# Patient Record
Sex: MALE | Race: WHITE | NOT HISPANIC OR LATINO
[De-identification: names, ages, dates, MRNs, and addresses within clinical notes are randomized per-mention and may not be internally consistent; named-entity substitution may affect disease eponyms.]

---

## 2021-04-12 ENCOUNTER — APPOINTMENT (OUTPATIENT)
Dept: DISASTER EMERGENCY | Facility: OTHER | Age: 76
End: 2021-04-12

## 2021-04-12 PROBLEM — Z00.00 ENCOUNTER FOR PREVENTIVE HEALTH EXAMINATION: Status: ACTIVE | Noted: 2021-04-12

## 2023-01-02 RX ADMIN — Medication 1000 MILLIGRAM(S): at 12:04

## 2023-01-04 ENCOUNTER — APPOINTMENT (OUTPATIENT)
Dept: ORTHOPEDIC SURGERY | Facility: CLINIC | Age: 78
End: 2023-01-04
Payer: MEDICARE

## 2023-01-04 VITALS — HEIGHT: 71 IN | WEIGHT: 207 LBS | BODY MASS INDEX: 28.98 KG/M2

## 2023-01-04 DIAGNOSIS — M75.41 IMPINGEMENT SYNDROME OF RIGHT SHOULDER: ICD-10-CM

## 2023-01-04 DIAGNOSIS — Z86.79 PERSONAL HISTORY OF OTHER DISEASES OF THE CIRCULATORY SYSTEM: ICD-10-CM

## 2023-01-04 DIAGNOSIS — Z86.39 PERSONAL HISTORY OF OTHER ENDOCRINE, NUTRITIONAL AND METABOLIC DISEASE: ICD-10-CM

## 2023-01-04 DIAGNOSIS — M50.90 CERVICAL DISC DISORDER, UNSPECIFIED, UNSPECIFIED CERVICAL REGION: ICD-10-CM

## 2023-01-04 PROCEDURE — 99204 OFFICE O/P NEW MOD 45 MIN: CPT

## 2023-01-04 PROCEDURE — 73030 X-RAY EXAM OF SHOULDER: CPT | Mod: RT

## 2023-01-04 PROCEDURE — 73010 X-RAY EXAM OF SHOULDER BLADE: CPT | Mod: RT

## 2023-01-04 PROCEDURE — 72040 X-RAY EXAM NECK SPINE 2-3 VW: CPT

## 2023-01-04 NOTE — HISTORY OF PRESENT ILLNESS
[de-identified] : 77 year old male  (RHD Britany suarez)  right shoulder and neck since 12/2022 which he thinks was from having a cold.  pain located in the anterior and superior aspect of right shoulder, upper trap and cervical  with associated stiffness and ROM loss at cervical and right shoulder. worse with shoulder movement, sleeping.  has tried Advil.\par \par H/O Diabetes\par

## 2023-01-04 NOTE — IMAGING
[de-identified] : NECK:\par Inspection: no ecchymosis. \par Palpation: trapezial tenderness. \par Range of motion:  Full range of motion with mild stiffness . Pain at extremes of rotation to right. \par Strength Testing: motor exam is non-focal throughout both lower extremities\par Normal Deltoid, Biceps, Triceps, Wrist Flexors, Finger Abductors, Grasp \par Neurological testing: light touch is intact throughout both upper extremities\par Lopez reflex: neg\par Spurling test: neg\par \par \par \par RIGHT SHOULDER\par Inspection: No swelling. \par Palpation: Tenderness is noted at the bicipital groove, anterior and lateral. \par Range of motion: There is pain with range of motion.\par , ER 55, @90ER 90, @90IR 30\par Strength: There is pain and discomfort with strength testing.\par Forward Flexion 4/5. Abduction 4/5.  External Rotation 5-/5 and Internal Rotation 5/5 \par Neurological testings: motor and sensor intact distally.\par Ligament Stability and Special Tests: \par There is positive arc of pain. \par Shoulder apprehension: neg\par Shoulder relocation: neg\par Martinez’s test: pos\par Biceps Active test: neg\par [place] Labral Shear: neg\par Impingement testing: pos\par Wing testing: pos\par Whipple: pos\par Cross Body Adduction: neg\par \par \par

## 2023-01-04 NOTE — ASSESSMENT
[FreeTextEntry1] : Right X-Ray Examination of the SHOULDER (2 views):  no fractures, subluxations or dislocations. \par X-Ray Examination of the SCAPULA 1 or 2 views shows: no significant abnormalities\par X-Ray Examination of the CERVICAL SPINE 3 views (or less) shows: straightening consistent with spasm and disc space narrowing. \par \par - We discussed their diagnosis and treatment options at length including the risks and benefits of both surgical and non-surgical options.\par - We will conservative treatment with a course of PT and anti-inflammatory medication.\par - Rx given for Medrol dose pack\par - Discussed the possible side effects of medication along with the timing and frequency for taking.\par - fu with spine team if not better\par \par \par

## 2023-01-29 ENCOUNTER — INPATIENT (INPATIENT)
Facility: HOSPITAL | Age: 78
LOS: 7 days | Discharge: NURSING, REHAB OR LONG-TERM CARE | DRG: 281 | End: 2023-02-06
Attending: INTERNAL MEDICINE | Admitting: INTERNAL MEDICINE
Payer: MEDICARE

## 2023-01-29 VITALS
RESPIRATION RATE: 18 BRPM | SYSTOLIC BLOOD PRESSURE: 140 MMHG | WEIGHT: 250 LBS | DIASTOLIC BLOOD PRESSURE: 80 MMHG | HEART RATE: 90 BPM | TEMPERATURE: 98 F | OXYGEN SATURATION: 99 % | HEIGHT: 71 IN

## 2023-01-29 DIAGNOSIS — R06.00 DYSPNEA, UNSPECIFIED: ICD-10-CM

## 2023-01-29 DIAGNOSIS — Z98.890 OTHER SPECIFIED POSTPROCEDURAL STATES: Chronic | ICD-10-CM

## 2023-01-29 DIAGNOSIS — N40.0 BENIGN PROSTATIC HYPERPLASIA WITHOUT LOWER URINARY TRACT SYMPTOMS: ICD-10-CM

## 2023-01-29 DIAGNOSIS — I10 ESSENTIAL (PRIMARY) HYPERTENSION: ICD-10-CM

## 2023-01-29 DIAGNOSIS — F90.9 ATTENTION-DEFICIT HYPERACTIVITY DISORDER, UNSPECIFIED TYPE: ICD-10-CM

## 2023-01-29 DIAGNOSIS — R50.9 FEVER, UNSPECIFIED: ICD-10-CM

## 2023-01-29 DIAGNOSIS — R77.8 OTHER SPECIFIED ABNORMALITIES OF PLASMA PROTEINS: ICD-10-CM

## 2023-01-29 DIAGNOSIS — J45.909 UNSPECIFIED ASTHMA, UNCOMPLICATED: ICD-10-CM

## 2023-01-29 DIAGNOSIS — R06.09 OTHER FORMS OF DYSPNEA: ICD-10-CM

## 2023-01-29 DIAGNOSIS — E11.9 TYPE 2 DIABETES MELLITUS WITHOUT COMPLICATIONS: ICD-10-CM

## 2023-01-29 DIAGNOSIS — Z90.49 ACQUIRED ABSENCE OF OTHER SPECIFIED PARTS OF DIGESTIVE TRACT: Chronic | ICD-10-CM

## 2023-01-29 LAB
ALBUMIN SERPL ELPH-MCNC: 3.8 G/DL — SIGNIFICANT CHANGE UP (ref 3.3–5)
ALP SERPL-CCNC: 56 U/L — SIGNIFICANT CHANGE UP (ref 40–120)
ALT FLD-CCNC: 20 U/L — SIGNIFICANT CHANGE UP (ref 10–45)
ANION GAP SERPL CALC-SCNC: 13 MMOL/L — SIGNIFICANT CHANGE UP (ref 5–17)
APPEARANCE UR: CLEAR — SIGNIFICANT CHANGE UP
APTT BLD: 31 SEC — SIGNIFICANT CHANGE UP (ref 27.5–35.5)
AST SERPL-CCNC: 27 U/L — SIGNIFICANT CHANGE UP (ref 10–40)
BACTERIA # UR AUTO: NEGATIVE — SIGNIFICANT CHANGE UP
BASOPHILS # BLD AUTO: 0.04 K/UL — SIGNIFICANT CHANGE UP (ref 0–0.2)
BASOPHILS NFR BLD AUTO: 0.4 % — SIGNIFICANT CHANGE UP (ref 0–2)
BILIRUB SERPL-MCNC: 0.7 MG/DL — SIGNIFICANT CHANGE UP (ref 0.2–1.2)
BILIRUB UR-MCNC: NEGATIVE — SIGNIFICANT CHANGE UP
BUN SERPL-MCNC: 10 MG/DL — SIGNIFICANT CHANGE UP (ref 7–23)
CALCIUM SERPL-MCNC: 9.9 MG/DL — SIGNIFICANT CHANGE UP (ref 8.4–10.5)
CHLORIDE SERPL-SCNC: 98 MMOL/L — SIGNIFICANT CHANGE UP (ref 96–108)
CO2 SERPL-SCNC: 24 MMOL/L — SIGNIFICANT CHANGE UP (ref 22–31)
COLOR SPEC: YELLOW — SIGNIFICANT CHANGE UP
CREAT SERPL-MCNC: 0.62 MG/DL — SIGNIFICANT CHANGE UP (ref 0.5–1.3)
DIFF PNL FLD: ABNORMAL
EGFR: 98 ML/MIN/1.73M2 — SIGNIFICANT CHANGE UP
EOSINOPHIL # BLD AUTO: 0.08 K/UL — SIGNIFICANT CHANGE UP (ref 0–0.5)
EOSINOPHIL NFR BLD AUTO: 0.8 % — SIGNIFICANT CHANGE UP (ref 0–6)
EPI CELLS # UR: 2 /HPF — SIGNIFICANT CHANGE UP
GLUCOSE SERPL-MCNC: 105 MG/DL — HIGH (ref 70–99)
GLUCOSE UR QL: ABNORMAL
HCT VFR BLD CALC: 38.5 % — LOW (ref 39–50)
HGB BLD-MCNC: 12.6 G/DL — LOW (ref 13–17)
HYALINE CASTS # UR AUTO: 3 /LPF — HIGH (ref 0–2)
IMM GRANULOCYTES NFR BLD AUTO: 0.3 % — SIGNIFICANT CHANGE UP (ref 0–0.9)
INR BLD: 1.43 RATIO — HIGH (ref 0.88–1.16)
KETONES UR-MCNC: ABNORMAL
LEUKOCYTE ESTERASE UR-ACNC: NEGATIVE — SIGNIFICANT CHANGE UP
LYMPHOCYTES # BLD AUTO: 1.49 K/UL — SIGNIFICANT CHANGE UP (ref 1–3.3)
LYMPHOCYTES # BLD AUTO: 14.2 % — SIGNIFICANT CHANGE UP (ref 13–44)
MCHC RBC-ENTMCNC: 26.1 PG — LOW (ref 27–34)
MCHC RBC-ENTMCNC: 32.7 GM/DL — SIGNIFICANT CHANGE UP (ref 32–36)
MCV RBC AUTO: 79.7 FL — LOW (ref 80–100)
MONOCYTES # BLD AUTO: 0.73 K/UL — SIGNIFICANT CHANGE UP (ref 0–0.9)
MONOCYTES NFR BLD AUTO: 7 % — SIGNIFICANT CHANGE UP (ref 2–14)
NEUTROPHILS # BLD AUTO: 8.12 K/UL — HIGH (ref 1.8–7.4)
NEUTROPHILS NFR BLD AUTO: 77.3 % — HIGH (ref 43–77)
NITRITE UR-MCNC: NEGATIVE — SIGNIFICANT CHANGE UP
NRBC # BLD: 0 /100 WBCS — SIGNIFICANT CHANGE UP (ref 0–0)
PH UR: 6 — SIGNIFICANT CHANGE UP (ref 5–8)
PLATELET # BLD AUTO: 358 K/UL — SIGNIFICANT CHANGE UP (ref 150–400)
POTASSIUM SERPL-MCNC: 4.4 MMOL/L — SIGNIFICANT CHANGE UP (ref 3.5–5.3)
POTASSIUM SERPL-SCNC: 4.4 MMOL/L — SIGNIFICANT CHANGE UP (ref 3.5–5.3)
PROCALCITONIN SERPL-MCNC: 0.14 NG/ML — HIGH (ref 0.02–0.1)
PROT SERPL-MCNC: 8 G/DL — SIGNIFICANT CHANGE UP (ref 6–8.3)
PROT UR-MCNC: ABNORMAL
PROTHROM AB SERPL-ACNC: 16.6 SEC — HIGH (ref 10.5–13.4)
RAPID RVP RESULT: SIGNIFICANT CHANGE UP
RBC # BLD: 4.83 M/UL — SIGNIFICANT CHANGE UP (ref 4.2–5.8)
RBC # FLD: 15.1 % — HIGH (ref 10.3–14.5)
RBC CASTS # UR COMP ASSIST: 3 /HPF — SIGNIFICANT CHANGE UP (ref 0–4)
SARS-COV-2 RNA SPEC QL NAA+PROBE: SIGNIFICANT CHANGE UP
SODIUM SERPL-SCNC: 135 MMOL/L — SIGNIFICANT CHANGE UP (ref 135–145)
SP GR SPEC: 1.03 — HIGH (ref 1.01–1.02)
TROPONIN T, HIGH SENSITIVITY RESULT: 60 NG/L — HIGH (ref 0–51)
UROBILINOGEN FLD QL: NEGATIVE — SIGNIFICANT CHANGE UP
WBC # BLD: 10.49 K/UL — SIGNIFICANT CHANGE UP (ref 3.8–10.5)
WBC # FLD AUTO: 10.49 K/UL — SIGNIFICANT CHANGE UP (ref 3.8–10.5)
WBC UR QL: 4 /HPF — SIGNIFICANT CHANGE UP (ref 0–5)

## 2023-01-29 PROCEDURE — 99285 EMERGENCY DEPT VISIT HI MDM: CPT | Mod: CS

## 2023-01-29 PROCEDURE — 71045 X-RAY EXAM CHEST 1 VIEW: CPT | Mod: 26

## 2023-01-29 PROCEDURE — 99223 1ST HOSP IP/OBS HIGH 75: CPT

## 2023-01-29 PROCEDURE — 71275 CT ANGIOGRAPHY CHEST: CPT | Mod: 26,MA

## 2023-01-29 PROCEDURE — 74177 CT ABD & PELVIS W/CONTRAST: CPT | Mod: 26,MA

## 2023-01-29 RX ORDER — DEXTROSE 50 % IN WATER 50 %
15 SYRINGE (ML) INTRAVENOUS ONCE
Refills: 0 | Status: DISCONTINUED | OUTPATIENT
Start: 2023-01-29 | End: 2023-02-06

## 2023-01-29 RX ORDER — ATORVASTATIN CALCIUM 80 MG/1
80 TABLET, FILM COATED ORAL AT BEDTIME
Refills: 0 | Status: DISCONTINUED | OUTPATIENT
Start: 2023-01-29 | End: 2023-02-06

## 2023-01-29 RX ORDER — DONEPEZIL HYDROCHLORIDE 10 MG/1
5 TABLET, FILM COATED ORAL AT BEDTIME
Refills: 0 | Status: DISCONTINUED | OUTPATIENT
Start: 2023-01-29 | End: 2023-02-06

## 2023-01-29 RX ORDER — DOCUSATE SODIUM 100 MG
0 CAPSULE ORAL
Qty: 0 | Refills: 0 | DISCHARGE

## 2023-01-29 RX ORDER — DEXTROSE 50 % IN WATER 50 %
12.5 SYRINGE (ML) INTRAVENOUS ONCE
Refills: 0 | Status: DISCONTINUED | OUTPATIENT
Start: 2023-01-29 | End: 2023-02-06

## 2023-01-29 RX ORDER — DONEPEZIL HYDROCHLORIDE 10 MG/1
1 TABLET, FILM COATED ORAL
Qty: 0 | Refills: 0 | DISCHARGE

## 2023-01-29 RX ORDER — FLUTICASONE PROPIONATE AND SALMETEROL 50; 250 UG/1; UG/1
2 POWDER ORAL; RESPIRATORY (INHALATION)
Qty: 0 | Refills: 0 | DISCHARGE

## 2023-01-29 RX ORDER — INSULIN LISPRO 100/ML
VIAL (ML) SUBCUTANEOUS
Refills: 0 | Status: DISCONTINUED | OUTPATIENT
Start: 2023-01-29 | End: 2023-02-06

## 2023-01-29 RX ORDER — BUDESONIDE AND FORMOTEROL FUMARATE DIHYDRATE 160; 4.5 UG/1; UG/1
2 AEROSOL RESPIRATORY (INHALATION)
Refills: 0 | Status: DISCONTINUED | OUTPATIENT
Start: 2023-01-29 | End: 2023-02-06

## 2023-01-29 RX ORDER — VIBEGRON 75 MG/1
1 TABLET, FILM COATED ORAL
Qty: 0 | Refills: 0 | DISCHARGE

## 2023-01-29 RX ORDER — INSULIN LISPRO 100/ML
VIAL (ML) SUBCUTANEOUS AT BEDTIME
Refills: 0 | Status: DISCONTINUED | OUTPATIENT
Start: 2023-01-29 | End: 2023-02-06

## 2023-01-29 RX ORDER — SODIUM CHLORIDE 9 MG/ML
1000 INJECTION, SOLUTION INTRAVENOUS
Refills: 0 | Status: DISCONTINUED | OUTPATIENT
Start: 2023-01-29 | End: 2023-02-06

## 2023-01-29 RX ORDER — LOSARTAN POTASSIUM 100 MG/1
1 TABLET, FILM COATED ORAL
Qty: 0 | Refills: 0 | DISCHARGE

## 2023-01-29 RX ORDER — ACETAMINOPHEN 500 MG
1000 TABLET ORAL ONCE
Refills: 0 | Status: COMPLETED | OUTPATIENT
Start: 2023-01-29 | End: 2023-01-29

## 2023-01-29 RX ORDER — MONTELUKAST 4 MG/1
1 TABLET, CHEWABLE ORAL
Qty: 0 | Refills: 0 | DISCHARGE

## 2023-01-29 RX ORDER — ROSUVASTATIN CALCIUM 5 MG/1
1 TABLET ORAL
Qty: 0 | Refills: 0 | DISCHARGE

## 2023-01-29 RX ORDER — SODIUM CHLORIDE 9 MG/ML
1000 INJECTION INTRAMUSCULAR; INTRAVENOUS; SUBCUTANEOUS ONCE
Refills: 0 | Status: COMPLETED | OUTPATIENT
Start: 2023-01-29 | End: 2023-01-29

## 2023-01-29 RX ORDER — ASPIRIN/CALCIUM CARB/MAGNESIUM 324 MG
243 TABLET ORAL ONCE
Refills: 0 | Status: COMPLETED | OUTPATIENT
Start: 2023-01-29 | End: 2023-01-29

## 2023-01-29 RX ORDER — CANAGLIFLOZIN AND METFORMIN HYDROCHLORIDE 50; 500 MG/1; MG/1
1 TABLET, FILM COATED, EXTENDED RELEASE ORAL
Qty: 0 | Refills: 0 | DISCHARGE

## 2023-01-29 RX ORDER — GLUCAGON INJECTION, SOLUTION 0.5 MG/.1ML
1 INJECTION, SOLUTION SUBCUTANEOUS ONCE
Refills: 0 | Status: DISCONTINUED | OUTPATIENT
Start: 2023-01-29 | End: 2023-02-06

## 2023-01-29 RX ORDER — MONTELUKAST 4 MG/1
10 TABLET, CHEWABLE ORAL DAILY
Refills: 0 | Status: DISCONTINUED | OUTPATIENT
Start: 2023-01-29 | End: 2023-02-06

## 2023-01-29 RX ORDER — ACETAMINOPHEN 500 MG
650 TABLET ORAL EVERY 6 HOURS
Refills: 0 | Status: DISCONTINUED | OUTPATIENT
Start: 2023-01-29 | End: 2023-02-06

## 2023-01-29 RX ORDER — ASPIRIN/CALCIUM CARB/MAGNESIUM 324 MG
81 TABLET ORAL DAILY
Refills: 0 | Status: DISCONTINUED | OUTPATIENT
Start: 2023-01-29 | End: 2023-02-06

## 2023-01-29 RX ORDER — INSULIN GLARGINE 100 [IU]/ML
16 INJECTION, SOLUTION SUBCUTANEOUS AT BEDTIME
Refills: 0 | Status: DISCONTINUED | OUTPATIENT
Start: 2023-01-29 | End: 2023-02-06

## 2023-01-29 RX ORDER — TADALAFIL 10 MG/1
1 TABLET, FILM COATED ORAL
Qty: 0 | Refills: 0 | DISCHARGE

## 2023-01-29 RX ORDER — LANOLIN ALCOHOL/MO/W.PET/CERES
3 CREAM (GRAM) TOPICAL AT BEDTIME
Refills: 0 | Status: DISCONTINUED | OUTPATIENT
Start: 2023-01-29 | End: 2023-02-06

## 2023-01-29 RX ORDER — PANTOPRAZOLE SODIUM 20 MG/1
1 TABLET, DELAYED RELEASE ORAL
Qty: 0 | Refills: 0 | DISCHARGE

## 2023-01-29 RX ORDER — PANTOPRAZOLE SODIUM 20 MG/1
40 TABLET, DELAYED RELEASE ORAL
Refills: 0 | Status: DISCONTINUED | OUTPATIENT
Start: 2023-01-29 | End: 2023-02-06

## 2023-01-29 RX ORDER — DEXTROSE 50 % IN WATER 50 %
25 SYRINGE (ML) INTRAVENOUS ONCE
Refills: 0 | Status: DISCONTINUED | OUTPATIENT
Start: 2023-01-29 | End: 2023-02-06

## 2023-01-29 RX ORDER — LOSARTAN POTASSIUM 100 MG/1
100 TABLET, FILM COATED ORAL DAILY
Refills: 0 | Status: DISCONTINUED | OUTPATIENT
Start: 2023-01-29 | End: 2023-02-06

## 2023-01-29 RX ORDER — INSULIN DEGLUDEC 100 U/ML
20 INJECTION, SOLUTION SUBCUTANEOUS
Qty: 0 | Refills: 0 | DISCHARGE

## 2023-01-29 RX ORDER — ONDANSETRON 8 MG/1
4 TABLET, FILM COATED ORAL EVERY 8 HOURS
Refills: 0 | Status: DISCONTINUED | OUTPATIENT
Start: 2023-01-29 | End: 2023-02-06

## 2023-01-29 RX ORDER — TADALAFIL 10 MG/1
5 TABLET, FILM COATED ORAL DAILY
Refills: 0 | Status: DISCONTINUED | OUTPATIENT
Start: 2023-01-30 | End: 2023-02-06

## 2023-01-29 RX ADMIN — SODIUM CHLORIDE 1000 MILLILITER(S): 9 INJECTION INTRAMUSCULAR; INTRAVENOUS; SUBCUTANEOUS at 12:13

## 2023-01-29 RX ADMIN — INSULIN GLARGINE 16 UNIT(S): 100 INJECTION, SOLUTION SUBCUTANEOUS at 23:04

## 2023-01-29 RX ADMIN — Medication 243 MILLIGRAM(S): at 14:40

## 2023-01-29 RX ADMIN — ATORVASTATIN CALCIUM 80 MILLIGRAM(S): 80 TABLET, FILM COATED ORAL at 23:03

## 2023-01-29 RX ADMIN — DONEPEZIL HYDROCHLORIDE 5 MILLIGRAM(S): 10 TABLET, FILM COATED ORAL at 23:04

## 2023-01-29 RX ADMIN — Medication 400 MILLIGRAM(S): at 11:34

## 2023-01-29 NOTE — H&P ADULT - NSICDXPASTMEDICALHX_GEN_ALL_CORE_FT
PAST MEDICAL HISTORY:  ADHD     Asthma     Diabetes     HLD (hyperlipidemia)     HTN (hypertension)

## 2023-01-29 NOTE — H&P ADULT - NSHPREVIEWOFSYSTEMS_GEN_ALL_CORE
CONSTITUTIONAL: +  weakness, no fevers or chills  EYES/ENT: No visual changes;  No dysphagia  NECK: No pain or stiffness  RESPIRATORY: No cough, wheezing, hemoptysis;+  shortness of breath  CARDIOVASCULAR: No chest pain,  +  palpitations; No lower extremity edema  EXTREMITIES: no le edema, cyanosis, clubbing  GASTROINTESTINAL: No abdominal or epigastric pain. No nausea, vomiting, or hematemesis; No diarrhea or constipation. No melena or hematochezia.  BACK: + back pain  GENITOURINARY: No dysuria, frequency or hematuria  NEUROLOGICAL: No numbness or weakness  MSK: no joint swelling or pain  SKIN: No itching, burning, rashes, or lesions   PSYCH: no agitation  All other review of systems is negative unless indicated above.

## 2023-01-29 NOTE — ED PROVIDER NOTE - OBJECTIVE STATEMENT
78 y/o male PMHx HTN, HLD, DM, memory impairment now presenting to the ED c/o non productive cough, SOB, CHESTER and generalized weakness x3 weeks. Patient reported he had the flu 3-4 weeks ago and felt bed bound while recovering from the flu. Patient has CHESTER while doing light exercises during physical therapy. Denied CP, abdominal pain, N/V/D, fever chills, urinary symptoms, back pain, rash, skin changes

## 2023-01-29 NOTE — H&P ADULT - ASSESSMENT
77 M  w/pmh  of HTN, HLD, DM, asthma , memory impairment presenting for  shortness or breath for the past few weeks.

## 2023-01-29 NOTE — ED PROVIDER NOTE - ATTENDING APP SHARED VISIT CONTRIBUTION OF CARE
Patient is a 78 yo M with history of DM, HTN, asthma here for dyspnea on exertion and while speaking for about 1 month. He states he had the flu 1 month ago. He denies any chest pain, dizziness, headaches, abdominal pain. No fevers, chills. He reports decreased appetite. He complains of feeling very weak. He states he has been unable to participate in physical therapy because of his dyspnea. Denies urinary symptoms, diarrhea, black or bloody stools. He denies recent sick contacts.     VS noted  Gen. no acute distress, Non toxic   HEENT: EOMI, mmm  Lungs: CTAB/L no C/ W /R   CVS: RRR   Abd; Soft non tender, non distended   Ext: no edema  Skin: no rash  Neuro AAOx3 non focal clear speech  a/p: CHESTER, weakness- patient febrile here. Will send sepsis labs. Will check RVP, CXR, u/a. Patient appears very weak. Will reassess after work up. Likely admission.   - Gurdeep NARAYAN

## 2023-01-29 NOTE — ED ADULT NURSE NOTE - OBJECTIVE STATEMENT
78 y/o PMHx HTN, HLD, DM, memory impairment now presenting to the ED c/o non productive cough, SOB, CHESTER and generalized weakness x3 weeks. Patient reported he had the flu 3-4 weeks ago and felt bed bound while recovering from the flu. Patient has CHESTER while doing light exercises during physical therapy. Placed on monitor. Pt afebrile. Ofirmev given. Safety precautions maintained. Denies cp/n/v/d/dizziness

## 2023-01-29 NOTE — H&P ADULT - NSHPLABSRESULTS_GEN_ALL_CORE
Labs personally reviewed:                          12.6   10.49 )-----------( 358      ( 2023 11:50 )             38.5         135  |  98  |  10  ----------------------------<  105<H>  4.4   |  24  |  0.62    Ca    9.9      2023 11:50    TPro  8.0  /  Alb  3.8  /  TBili  0.7  /  DBili  x   /  AST  27  /  ALT  20  /  AlkPhos  56          LIVER FUNCTIONS - ( 2023 11:50 )  Alb: 3.8 g/dL / Pro: 8.0 g/dL / ALK PHOS: 56 U/L / ALT: 20 U/L / AST: 27 U/L / GGT: x           PT/INR - ( 2023 11:50 )   PT: 16.6 sec;   INR: 1.43 ratio         PTT - ( 2023 11:50 )  PTT:31.0 sec  Urinalysis Basic - ( 2023 14:27 )    Color: Yellow / Appearance: Clear / S.026 / pH: x  Gluc: x / Ketone: Small  / Bili: Negative / Urobili: Negative   Blood: x / Protein: 30 mg/dL / Nitrite: Negative   Leuk Esterase: Negative / RBC: 3 /hpf / WBC 4 /HPF   Sq Epi: x / Non Sq Epi: 2 /hpf / Bacteria: Negative      CAPILLARY BLOOD GLUCOSE      POCT Blood Glucose.: 91 mg/dL (2023 12:54)      Imaging:  CXR personally reviewed: no focal opacity  CTA chest: No pulmonary emboli visualized. No source of infection seen.    No acute intrathoracic or abdominal pathology visualized.      EKG personally reviewed: nsr at 86 bpm , no acute st changes

## 2023-01-29 NOTE — PATIENT PROFILE ADULT - FUNCTIONAL ASSESSMENT - DAILY ACTIVITY 2.
FACULTY SIGN-OUT  ADDENDUM     Care of this patient was assumed from Dr Colletta Sessions. The patient was seen for Hypoglycemia (began yesterday afternoon) and Nausea  . The patient's initial evaluation and plan have been discussed with the prior provider who initially evaluated the patient. Nursing Notes, Past Medical Hx, Past Surgical Hx, Social Hx, Allergies, and Family Hx were all reviewed. ED COURSE      The patient was given the following medications:  Orders Placed This Encounter   Medications    HYDROmorphone (DILAUDID) injection 0.5 mg    ondansetron (ZOFRAN) injection 4 mg    dextrose 50 % solution 25 g    HYDROmorphone (DILAUDID) injection 0.5 mg       RECENT VITALS:   Temp: 97.2 °F (36.2 °C), Pulse: 100, Resp: 18, BP: 130/72    MEDICAL DECISION MAKING       Pt with hypoglycemia. Treated with dextrose and PO. Plan to repeat glucose and probable d/c.       Layla Irvin MD  Emergency Medicine Attending       Sekou Morrison MD  09/22/17 [id number] = No assist / stand by assistance

## 2023-01-29 NOTE — H&P ADULT - PROBLEM SELECTOR PLAN 3
bnp wnl , chest imaging w/o PE , no lung pathology noted  , symptoms can be 2/2 deconditioning Vs. anginal equivalent    - Echocardiogram

## 2023-01-29 NOTE — ED PROVIDER NOTE - CLINICAL SUMMARY MEDICAL DECISION MAKING FREE TEXT BOX
Gurdeep NARAYAN: See my attestation for full history and exam. 76 yo M with history of DM, HTN, asthma here for dyspnea on exertion and while speaking for about 1 month. He states he had the flu 1 month ago. He denies any chest pain, dizziness, headaches, abdominal pain. No fevers, chills. He reports decreased appetite. He complains of feeling very weak. He states he has been unable to participate in physical therapy because of his dyspnea. Denies urinary symptoms, diarrhea, black or bloody stools. He denies recent sick contacts.   Exam is non-focal.   CHESTER, weakness- patient febrile here. Will send sepsis labs. Will check RVP, CXR, u/a. Patient appears very weak.     Differential includes infectious etiology, ACS, myocarditis, cardiomyopathy, pneumonia.     EKG reviewed with NSR and RBBB without ischemic changes.   Trop found to be elevated at 57 and 60. CTA Chest did not show PE.    Workup was discussed with cardiologist Dr. Rajesh Siu who agreed with admission for elevated trops and fever of unknown origin. recommended cardiology Dr. Candelaria to consult. spoke to Dr. Candelaria who recommended admission to [facility] and will follow. Teagan will call [facility]

## 2023-01-29 NOTE — PATIENT PROFILE ADULT - FUNCTIONAL ASSESSMENT - BASIC MOBILITY 6.
-Nephrology consulted   -pt completed HD today with 3 L removed  -outpatient HD schedule T, TH, S  -pt followed outpatient by Dr. Petty (Nephrology)     4 = No assist / stand by assistance

## 2023-01-29 NOTE — H&P ADULT - PROBLEM SELECTOR PLAN 2
no source of infection identified  , RVP neg   , UA neg , CXR clear ,CTA chest/ AP without infectious findings   - monitor off antibiotics   - f/u blood and urine cultures   - serial blood cultures   - sed rate/ CRP   - monitor fever curve

## 2023-01-29 NOTE — H&P ADULT - PROBLEM SELECTOR PLAN 4
Reduced home dose while inpatient , can uptitrate as tolerates  - Lantus  hs   - Hold oral hypoglycemics  - insulin correction scale  - check fsg qac/qhs  - check a1c in am  - consistent carb diet

## 2023-01-29 NOTE — H&P ADULT - NSHPPHYSICALEXAM_GEN_ALL_CORE
Vital Signs Last 24 Hrs  T(C): 36.3 (29 Jan 2023 19:45), Max: 38.2 (29 Jan 2023 11:07)  T(F): 97.4 (29 Jan 2023 19:45), Max: 100.8 (29 Jan 2023 11:07)  HR: 84 (29 Jan 2023 19:45) (78 - 91)  BP: 151/75 (29 Jan 2023 19:45) (133/81 - 151/75)  BP(mean): --  RR: 18 (29 Jan 2023 19:45) (18 - 19)  SpO2: 98% (29 Jan 2023 19:45) (97% - 99%)    Parameters below as of 29 Jan 2023 19:45  Patient On (Oxygen Delivery Method): room air Vital Signs Last 24 Hrs  T(C): 36.3 (29 Jan 2023 19:45), Max: 38.2 (29 Jan 2023 11:07)  T(F): 97.4 (29 Jan 2023 19:45), Max: 100.8 (29 Jan 2023 11:07)  HR: 84 (29 Jan 2023 19:45) (78 - 91)  BP: 151/75 (29 Jan 2023 19:45) (133/81 - 151/75)  BP(mean): --  RR: 18 (29 Jan 2023 19:45) (18 - 19)  SpO2: 98% (29 Jan 2023 19:45) (97% - 99%)    Parameters below as of 29 Jan 2023 19:45  Patient On (Oxygen Delivery Method): room air    GENERAL: No acute distress, well-developed  HEAD:  Atraumatic, Normocephalic  ENT: EOMI, PERRLA, conjunctiva and sclera clear,  moist mucosa no pharyngeal erythema or exudates   NECK: supple , no JVD   CHEST/LUNG: Clear to auscultation bilaterally; No wheeze, equal breath sounds bilaterally   BACK: No spinal tenderness,  No CVA tenderness   HEART: Regular rate and rhythm; No murmurs, rubs, or gallops  ABDOMEN: Soft, Nontender, Nondistended; Bowel sounds present  EXTREMITIES:  No clubbing, cyanosis, or edema  MSK: No joint swelling or effusions, ROM intact   PSYCH: Normal behavior/affect  NEUROLOGY: AAOx3, non-focal, cranial nerves intact  SKIN: healed plantar foot ulcer on right , Normal color, No rashes or lesions

## 2023-01-29 NOTE — ED PROVIDER NOTE - PROGRESS NOTE DETAILS
NICKOLAS Mccracken: cardiologist Dr. Rajesh Siu agreed with admission for elevated trops and fever of unknown origin. recommended cardiology Dr. Candelaria to consult. spoke to Dr. Candelaria who recommended admission to [facility] and will follow. [facility]  Pateint signed out to me by Dr. Mackenzie, CTA did not show PE.  cardiologist Dr. Rajesh Siu agreed with admission for elevated trops and fever of unknown origin. recommended cardiology Dr. Candelaria to consult. spoke to Dr. Candelaria who recommended admission to [facility] and will follow.

## 2023-01-29 NOTE — H&P ADULT - HISTORY OF PRESENT ILLNESS
Patient is a 77 year old male w/pmh  of HTN, HLD, DM, memory impairment presenting for a cough and shortness or breath for the past few weeks.   Patient reports recent flu infection about 4 weeks prior to admission. Since that time , patient has a  non productive cough associated with shortness of breath , increased dyspnea with exertion and decreased exercise tolerance . He reports no chest pain , no fever or chills.    Patient is a 77 year old male w/pmh  of HTN, HLD, DM, asthma , memory impairment presenting for  shortness or breath for the past few weeks.   Patient reports recent flu infection about 4 weeks prior to admission. Since that time , patient  reports feeling fatigue , has a  non productive cough, intermittent nasal congestion,   associated with shortness of breath , increased dyspnea with exertion and decreased exercise tolerance .  He reports no chest pain , but does have palpitations , he has no lower extremity edema ,  he reports no fever or chills, no dysuria , no abdominal pain , no nausea or vomiting and no diarrhea.  of note , patient recently returned from a trip to [place]. His son was recently ill with a URI . He also reports increased social stressors during this time.

## 2023-01-29 NOTE — H&P ADULT - PROBLEM SELECTOR PLAN 1
suspect type II nstemi in setting of increased stress and infection , however high risk for cardiac disease will require inpatient monitoring and work up pending further discussion with cardiology   - telemetry   - trend cardiac enzymes   - Cardiology consult - to be arranged by day team  - continue asa   - statin

## 2023-01-30 LAB
A1C WITH ESTIMATED AVERAGE GLUCOSE RESULT: 7 % — HIGH (ref 4–5.6)
ALBUMIN SERPL ELPH-MCNC: 3.4 G/DL — SIGNIFICANT CHANGE UP (ref 3.3–5)
ALP SERPL-CCNC: 52 U/L — SIGNIFICANT CHANGE UP (ref 40–120)
ALT FLD-CCNC: 19 U/L — SIGNIFICANT CHANGE UP (ref 10–45)
ANION GAP SERPL CALC-SCNC: 11 MMOL/L — SIGNIFICANT CHANGE UP (ref 5–17)
AST SERPL-CCNC: 23 U/L — SIGNIFICANT CHANGE UP (ref 10–40)
BASOPHILS # BLD AUTO: 0.04 K/UL — SIGNIFICANT CHANGE UP (ref 0–0.2)
BASOPHILS NFR BLD AUTO: 0.3 % — SIGNIFICANT CHANGE UP (ref 0–2)
BILIRUB SERPL-MCNC: 0.6 MG/DL — SIGNIFICANT CHANGE UP (ref 0.2–1.2)
BUN SERPL-MCNC: 11 MG/DL — SIGNIFICANT CHANGE UP (ref 7–23)
CALCIUM SERPL-MCNC: 9.4 MG/DL — SIGNIFICANT CHANGE UP (ref 8.4–10.5)
CHLORIDE SERPL-SCNC: 99 MMOL/L — SIGNIFICANT CHANGE UP (ref 96–108)
CO2 SERPL-SCNC: 24 MMOL/L — SIGNIFICANT CHANGE UP (ref 22–31)
CREAT SERPL-MCNC: 0.54 MG/DL — SIGNIFICANT CHANGE UP (ref 0.5–1.3)
CRP SERPL-MCNC: 65 MG/L — HIGH (ref 0–4)
CULTURE RESULTS: SIGNIFICANT CHANGE UP
EGFR: 102 ML/MIN/1.73M2 — SIGNIFICANT CHANGE UP
EOSINOPHIL # BLD AUTO: 0.07 K/UL — SIGNIFICANT CHANGE UP (ref 0–0.5)
EOSINOPHIL NFR BLD AUTO: 0.6 % — SIGNIFICANT CHANGE UP (ref 0–6)
ERYTHROCYTE [SEDIMENTATION RATE] IN BLOOD: 120 MM/HR — HIGH (ref 0–20)
ESTIMATED AVERAGE GLUCOSE: 154 MG/DL — HIGH (ref 68–114)
GLUCOSE SERPL-MCNC: 107 MG/DL — HIGH (ref 70–99)
HCT VFR BLD CALC: 35.5 % — LOW (ref 39–50)
HGB BLD-MCNC: 11.4 G/DL — LOW (ref 13–17)
IMM GRANULOCYTES NFR BLD AUTO: 0.6 % — SIGNIFICANT CHANGE UP (ref 0–0.9)
LEGIONELLA AG UR QL: NEGATIVE — SIGNIFICANT CHANGE UP
LYMPHOCYTES # BLD AUTO: 1.43 K/UL — SIGNIFICANT CHANGE UP (ref 1–3.3)
LYMPHOCYTES # BLD AUTO: 12.4 % — LOW (ref 13–44)
MCHC RBC-ENTMCNC: 25.7 PG — LOW (ref 27–34)
MCHC RBC-ENTMCNC: 32.1 GM/DL — SIGNIFICANT CHANGE UP (ref 32–36)
MCV RBC AUTO: 80.1 FL — SIGNIFICANT CHANGE UP (ref 80–100)
MONOCYTES # BLD AUTO: 0.85 K/UL — SIGNIFICANT CHANGE UP (ref 0–0.9)
MONOCYTES NFR BLD AUTO: 7.4 % — SIGNIFICANT CHANGE UP (ref 2–14)
MRSA PCR RESULT.: SIGNIFICANT CHANGE UP
NEUTROPHILS # BLD AUTO: 9.09 K/UL — HIGH (ref 1.8–7.4)
NEUTROPHILS NFR BLD AUTO: 78.7 % — HIGH (ref 43–77)
NRBC # BLD: 0 /100 WBCS — SIGNIFICANT CHANGE UP (ref 0–0)
PLATELET # BLD AUTO: 365 K/UL — SIGNIFICANT CHANGE UP (ref 150–400)
POTASSIUM SERPL-MCNC: 3.9 MMOL/L — SIGNIFICANT CHANGE UP (ref 3.5–5.3)
POTASSIUM SERPL-SCNC: 3.9 MMOL/L — SIGNIFICANT CHANGE UP (ref 3.5–5.3)
PROT SERPL-MCNC: 7.3 G/DL — SIGNIFICANT CHANGE UP (ref 6–8.3)
RBC # BLD: 4.43 M/UL — SIGNIFICANT CHANGE UP (ref 4.2–5.8)
RBC # FLD: 15 % — HIGH (ref 10.3–14.5)
S AUREUS DNA NOSE QL NAA+PROBE: SIGNIFICANT CHANGE UP
SODIUM SERPL-SCNC: 134 MMOL/L — LOW (ref 135–145)
SPECIMEN SOURCE: SIGNIFICANT CHANGE UP
TROPONIN T, HIGH SENSITIVITY RESULT: 65 NG/L — HIGH (ref 0–51)
TROPONIN T, HIGH SENSITIVITY RESULT: 66 NG/L — HIGH (ref 0–51)
TROPONIN T, HIGH SENSITIVITY RESULT: 68 NG/L — HIGH (ref 0–51)
WBC # BLD: 11.55 K/UL — HIGH (ref 3.8–10.5)
WBC # FLD AUTO: 11.55 K/UL — HIGH (ref 3.8–10.5)

## 2023-01-30 RX ORDER — HEPARIN SODIUM 5000 [USP'U]/ML
5000 INJECTION INTRAVENOUS; SUBCUTANEOUS EVERY 8 HOURS
Refills: 0 | Status: DISCONTINUED | OUTPATIENT
Start: 2023-01-30 | End: 2023-02-06

## 2023-01-30 RX ADMIN — INSULIN GLARGINE 16 UNIT(S): 100 INJECTION, SOLUTION SUBCUTANEOUS at 22:19

## 2023-01-30 RX ADMIN — DONEPEZIL HYDROCHLORIDE 5 MILLIGRAM(S): 10 TABLET, FILM COATED ORAL at 22:19

## 2023-01-30 RX ADMIN — Medication 81 MILLIGRAM(S): at 12:07

## 2023-01-30 RX ADMIN — BUDESONIDE AND FORMOTEROL FUMARATE DIHYDRATE 2 PUFF(S): 160; 4.5 AEROSOL RESPIRATORY (INHALATION) at 17:31

## 2023-01-30 RX ADMIN — LOSARTAN POTASSIUM 100 MILLIGRAM(S): 100 TABLET, FILM COATED ORAL at 06:05

## 2023-01-30 RX ADMIN — TADALAFIL 5 MILLIGRAM(S): 10 TABLET, FILM COATED ORAL at 12:08

## 2023-01-30 RX ADMIN — HEPARIN SODIUM 5000 UNIT(S): 5000 INJECTION INTRAVENOUS; SUBCUTANEOUS at 22:53

## 2023-01-30 RX ADMIN — PANTOPRAZOLE SODIUM 40 MILLIGRAM(S): 20 TABLET, DELAYED RELEASE ORAL at 06:05

## 2023-01-30 RX ADMIN — Medication 1: at 12:31

## 2023-01-30 RX ADMIN — MONTELUKAST 10 MILLIGRAM(S): 4 TABLET, CHEWABLE ORAL at 12:07

## 2023-01-30 RX ADMIN — BUDESONIDE AND FORMOTEROL FUMARATE DIHYDRATE 2 PUFF(S): 160; 4.5 AEROSOL RESPIRATORY (INHALATION) at 06:05

## 2023-01-30 RX ADMIN — ATORVASTATIN CALCIUM 80 MILLIGRAM(S): 80 TABLET, FILM COATED ORAL at 22:19

## 2023-01-30 NOTE — PROGRESS NOTE ADULT - SUBJECTIVE AND OBJECTIVE BOX
Name of Patient : MARY JANE CORONADO  MRN: [id number]  Date of visit: 23 @ 13:24      Subjective: Patient seen and examined. No new events except as noted.   Patient seen earlier this AM. Offers no new complaints  States he would like to go home  Echo pending     REVIEW OF SYSTEMS:    CONSTITUTIONAL: Generalized weakness   EYES/ENT: No visual changes;  No vertigo or throat pain   NECK: No pain or stiffness  RESPIRATORY: No cough, wheezing, hemoptysis; No shortness of breath  CARDIOVASCULAR: No chest pain or palpitations  GASTROINTESTINAL: No abdominal or epigastric pain. No nausea, vomiting, or hematemesis; No diarrhea or constipation. No melena or hematochezia.  GENITOURINARY: No dysuria, frequency or hematuria  NEUROLOGICAL: No numbness or weakness  SKIN: No itching, burning, rashes, or lesions   All other review of systems is negative unless indicated above.    MEDICATIONS:  MEDICATIONS  (STANDING):  aspirin enteric coated 81 milliGRAM(s) Oral daily  atorvastatin 80 milliGRAM(s) Oral at bedtime  budesonide 160 MICROgram(s)/formoterol 4.5 MICROgram(s) Inhaler 2 Puff(s) Inhalation two times a day  dextroamphetamine ER capsule 10 milliGRAM(s) 10 milliGRAM(s) Oral daily  dextrose 5%. 1000 milliLiter(s) (100 mL/Hr) IV Continuous <Continuous>  dextrose 5%. 1000 milliLiter(s) (50 mL/Hr) IV Continuous <Continuous>  dextrose 50% Injectable 25 Gram(s) IV Push once  dextrose 50% Injectable 12.5 Gram(s) IV Push once  donepezil 5 milliGRAM(s) Oral at bedtime  Gemtesa 75mg Tablet 1 Tablet(s) 1 Tablet(s) Oral daily  glucagon  Injectable 1 milliGRAM(s) IntraMuscular once  insulin glargine Injectable (LANTUS) 16 Unit(s) SubCutaneous at bedtime  insulin lispro (ADMELOG) corrective regimen sliding scale   SubCutaneous three times a day before meals  insulin lispro (ADMELOG) corrective regimen sliding scale   SubCutaneous at bedtime  losartan 100 milliGRAM(s) Oral daily  montelukast 10 milliGRAM(s) Oral daily  pantoprazole    Tablet 40 milliGRAM(s) Oral before breakfast  tadalafil 5 milliGRAM(s) Oral daily      PHYSICAL EXAM:  T(C): 36.9 (23 @ 11:16), Max: 37.8 (23 @ 04:53)  HR: 78 (23 @ 11:16) (78 - 88)  BP: 110/66 (23 @ 11:16) (110/66 - 151/75)  RR: 18 (23 @ 11:16) (18 - 18)  SpO2: 94% (23 @ 11:16) (94% - 98%)  Wt(kg): --  I&O's Summary        Appearance: Normal	  HEENT: Eyes are open   Lymphatic: No lymphadenopathy   Cardiovascular: Normal S1 S2, no JVD  Respiratory: normal effort , clear  Gastrointestinal:  Soft, Non-tender  Skin: No rashes,  warm to touch  Psychiatry:  Mood & affect appropriate  Musculoskeletal: No edema                          11.4   11.55 )-----------( 365      ( 2023 06:58 )             35.5                   134<L>  |  99  |  11  ----------------------------<  107<H>  3.9   |  24  |  0.54    Ca    9.4      2023 06:58    TPro  7.3  /  Alb  3.4  /  TBili  0.6  /  DBili  x   /  AST  23  /  ALT  19  /  AlkPhos  52      PT/INR - ( 2023 11:50 )   PT: 16.6 sec;   INR: 1.43 ratio         PTT - ( 2023 11:50 )  PTT:31.0 sec                   Urinalysis Basic - ( 2023 14:27 )    Color: Yellow / Appearance: Clear / S.026 / pH: x  Gluc: x / Ketone: Small  / Bili: Negative / Urobili: Negative   Blood: x / Protein: 30 mg/dL / Nitrite: Negative   Leuk Esterase: Negative / RBC: 3 /hpf / WBC 4 /HPF   Sq Epi: x / Non Sq Epi: 2 /hpf / Bacteria: Negative        < from: CT Abdomen and Pelvis w/ IV Cont (23 @ 16:31) >  IMPRESSION:  No pulmonary emboli visualized. No source of infection seen.    No acute intrathoracic or abdominal pathology visualized.        --- End of Report ---    < end of copied text >         Name of Patient : MARY JANE CORONADO  MRN: [id number]  Date of visit: 23 @ 13:24      Subjective: Patient seen and examined. No new events except as noted.   Patient seen earlier this AM. Offers no new complaints  States he would like to go home  Echo pending   Low grade temperature     REVIEW OF SYSTEMS:    CONSTITUTIONAL: Generalized weakness; Low grade temperature   EYES/ENT: No visual changes;  No vertigo or throat pain   NECK: No pain or stiffness  RESPIRATORY: No cough, wheezing, hemoptysis; No shortness of breath  CARDIOVASCULAR: No chest pain or palpitations  GASTROINTESTINAL: No abdominal or epigastric pain. No nausea, vomiting, or hematemesis; No diarrhea or constipation. No melena or hematochezia.  GENITOURINARY: No dysuria, frequency or hematuria  NEUROLOGICAL: No numbness or weakness  SKIN: No itching, burning, rashes, or lesions   All other review of systems is negative unless indicated above.    MEDICATIONS:  MEDICATIONS  (STANDING):  aspirin enteric coated 81 milliGRAM(s) Oral daily  atorvastatin 80 milliGRAM(s) Oral at bedtime  budesonide 160 MICROgram(s)/formoterol 4.5 MICROgram(s) Inhaler 2 Puff(s) Inhalation two times a day  dextroamphetamine ER capsule 10 milliGRAM(s) 10 milliGRAM(s) Oral daily  dextrose 5%. 1000 milliLiter(s) (100 mL/Hr) IV Continuous <Continuous>  dextrose 5%. 1000 milliLiter(s) (50 mL/Hr) IV Continuous <Continuous>  dextrose 50% Injectable 25 Gram(s) IV Push once  dextrose 50% Injectable 12.5 Gram(s) IV Push once  donepezil 5 milliGRAM(s) Oral at bedtime  Gemtesa 75mg Tablet 1 Tablet(s) 1 Tablet(s) Oral daily  glucagon  Injectable 1 milliGRAM(s) IntraMuscular once  insulin glargine Injectable (LANTUS) 16 Unit(s) SubCutaneous at bedtime  insulin lispro (ADMELOG) corrective regimen sliding scale   SubCutaneous three times a day before meals  insulin lispro (ADMELOG) corrective regimen sliding scale   SubCutaneous at bedtime  losartan 100 milliGRAM(s) Oral daily  montelukast 10 milliGRAM(s) Oral daily  pantoprazole    Tablet 40 milliGRAM(s) Oral before breakfast  tadalafil 5 milliGRAM(s) Oral daily      PHYSICAL EXAM:  T(C): 36.9 (23 @ 11:16), Max: 37.8 (23 @ 04:53)  HR: 78 (23 @ 11:16) (78 - 88)  BP: 110/66 (23 @ 11:16) (110/66 - 151/75)  RR: 18 (23 @ 11:16) (18 - 18)  SpO2: 94% (23 @ 11:16) (94% - 98%)  Wt(kg): --  I&O's Summary        Appearance: Normal	  HEENT: Eyes are open   Lymphatic: No lymphadenopathy   Cardiovascular: Normal S1 S2, no JVD  Respiratory: normal effort , clear  Gastrointestinal:  Soft, Non-tender  Skin: No rashes,  warm to touch  Psychiatry:  Mood & affect appropriate  Musculoskeletal: No edema                          11.4   11.55 )-----------( 365      ( 2023 06:58 )             35.5                   134<L>  |  99  |  11  ----------------------------<  107<H>  3.9   |  24  |  0.54    Ca    9.4      2023 06:58    TPro  7.3  /  Alb  3.4  /  TBili  0.6  /  DBili  x   /  AST  23  /  ALT  19  /  AlkPhos  52      PT/INR - ( 2023 11:50 )   PT: 16.6 sec;   INR: 1.43 ratio         PTT - ( 2023 11:50 )  PTT:31.0 sec                   Urinalysis Basic - ( 2023 14:27 )    Color: Yellow / Appearance: Clear / S.026 / pH: x  Gluc: x / Ketone: Small  / Bili: Negative / Urobili: Negative   Blood: x / Protein: 30 mg/dL / Nitrite: Negative   Leuk Esterase: Negative / RBC: 3 /hpf / WBC 4 /HPF   Sq Epi: x / Non Sq Epi: 2 /hpf / Bacteria: Negative        < from: CT Abdomen and Pelvis w/ IV Cont (23 @ 16:31) >  IMPRESSION:  No pulmonary emboli visualized. No source of infection seen.    No acute intrathoracic or abdominal pathology visualized.        --- End of Report ---    < end of copied text >      Culture - Urine (23 @ 14:27)   Specimen Source: Clean Catch Clean Catch (Midstream)   Culture Results:   <10,000 CFU/mL Normal Urogenital Tiana

## 2023-01-30 NOTE — CONSULT NOTE ADULT - SUBJECTIVE AND OBJECTIVE BOX
DATE OF SERVICE: 01-30-23 @ 09:53    CHIEF COMPLAINT:Patient is a 77y old  Male who presents with a chief complaint of sob x few weeks (29 Jan 2023 20:31)      HISTORY OF PRESENT ILLNESS:  Patient is a 77 year old male w/pmh  of HTN, HLD, DM, asthma , memory impairment presenting for  shortness or breath for the past few weeks.   Patient reports recent flu infection about 4 weeks prior to admission. Since that time , patient  reports feeling fatigue , has a  non productive cough, intermittent nasal congestion,   associated with shortness of breath , increased dyspnea with exertion and decreased exercise tolerance .  He reports no chest pain , but does have palpitations , he has no lower extremity edema ,  he reports no fever or chills, no dysuria , no abdominal pain , no nausea or vomiting and no diarrhea.  of note , patient recently returned from a trip to [place]. His son was recently ill with a URI . He also reports increased social stressors during this time.         PAST MEDICAL & SURGICAL HISTORY:  HTN (hypertension)      HLD (hyperlipidemia)      Diabetes      Asthma      ADHD      S/P appendectomy      H/O hernia repair              MEDICATIONS:  aspirin enteric coated 81 milliGRAM(s) Oral daily  losartan 100 milliGRAM(s) Oral daily  tadalafil 5 milliGRAM(s) Oral daily      budesonide 160 MICROgram(s)/formoterol 4.5 MICROgram(s) Inhaler 2 Puff(s) Inhalation two times a day  montelukast 10 milliGRAM(s) Oral daily    acetaminophen     Tablet .. 650 milliGRAM(s) Oral every 6 hours PRN  donepezil 5 milliGRAM(s) Oral at bedtime  melatonin 3 milliGRAM(s) Oral at bedtime PRN  ondansetron Injectable 4 milliGRAM(s) IV Push every 8 hours PRN    aluminum hydroxide/magnesium hydroxide/simethicone Suspension 30 milliLiter(s) Oral every 4 hours PRN  pantoprazole    Tablet 40 milliGRAM(s) Oral before breakfast    atorvastatin 80 milliGRAM(s) Oral at bedtime  dextrose 50% Injectable 25 Gram(s) IV Push once  dextrose 50% Injectable 12.5 Gram(s) IV Push once  dextrose Oral Gel 15 Gram(s) Oral once PRN  glucagon  Injectable 1 milliGRAM(s) IntraMuscular once  insulin glargine Injectable (LANTUS) 16 Unit(s) SubCutaneous at bedtime  insulin lispro (ADMELOG) corrective regimen sliding scale   SubCutaneous three times a day before meals  insulin lispro (ADMELOG) corrective regimen sliding scale   SubCutaneous at bedtime    dextrose 5%. 1000 milliLiter(s) IV Continuous <Continuous>  dextrose 5%. 1000 milliLiter(s) IV Continuous <Continuous>      FAMILY HISTORY:  FH: heart disease (Father)        Non-contributory    SOCIAL HISTORY:    [ ] Tobacco  [ ] Drugs  [ ] Alcohol    Allergies    No Known Allergies    Intolerances    	    REVIEW OF SYSTEMS:  CONSTITUTIONAL: No fever  EYES: No eye pain, visual disturbances, or discharge  ENMT:  No difficulty hearing, tinnitus  NECK: No pain or stiffness  RESPIRATORY: No cough, wheezing,  CARDIOVASCULAR: No chest pain, palpitations, passing out, dizziness, or leg swelling  GASTROINTESTINAL:  No nausea, vomiting, diarrhea or constipation. No melena.  GENITOURINARY: No dysuria, hematuria  NEUROLOGICAL: No stroke like symptoms  SKIN: No burning or lesions   ENDOCRINE: No heat or cold intolerance  MUSCULOSKELETAL: No joint pain or swelling  PSYCHIATRIC: No  anxiety, mood swings  HEME/LYMPH: No bleeding gums  ALLERGY AND IMMUNOLOGIC: No hives or eczema	    All other ROS negative    PHYSICAL EXAM:  T(C): 37.8 (01-30-23 @ 04:53), Max: 38.2 (01-29-23 @ 11:07)  HR: 88 (01-30-23 @ 04:53) (78 - 91)  BP: 142/74 (01-30-23 @ 04:53) (133/81 - 151/75)  RR: 18 (01-30-23 @ 04:53) (18 - 19)  SpO2: 95% (01-30-23 @ 04:53) (95% - 99%)  Wt(kg): --  I&O's Summary      Appearance: Normal	  HEENT:   Normal oral mucosa, EOMI	  Cardiovascular:  S1 S2, No JVD,    Respiratory: Lungs clear to auscultation	  Psychiatry: Alert  Gastrointestinal:  Soft, Non-tender, + BS	  Skin: No rashes   Neurologic: Non-focal  Extremities:  No edema  Vascular: Peripheral pulses palpable    	    	  	  CARDIAC MARKERS:  Labs personally reviewed by me                                  11.4   11.55 )-----------( 365      ( 30 Jan 2023 06:58 )             35.5     01-30    134<L>  |  99  |  11  ----------------------------<  107<H>  3.9   |  24  |  0.54    Ca    9.4      30 Jan 2023 06:58    TPro  7.3  /  Alb  3.4  /  TBili  0.6  /  DBili  x   /  AST  23  /  ALT  19  /  AlkPhos  52  01-30          EKG: Personally reviewed by me - NSR, RBBB   Radiology: Personally reviewed by me -       < from: Xray Chest 1 View- PORTABLE-Urgent (01.29.23 @ 12:32) >  IMPRESSION:  Clear lungs.    < end of copied text >  < from: CT Angio Chest PE Protocol w/ IV Cont (01.29.23 @ 16:30) >  IMPRESSION:  No pulmonary emboli visualized. No source of infection seen.    No acute intrathoracic or abdominal pathology visualized.    < end of copied text >  < from: CT Angio Chest PE Protocol w/ IV Cont (01.29.23 @ 16:30) >  VESSELS: Within normal limits. No pulmonary emboli  HEART: Heart size is normal. No pericardial effusion.      Assessment /Plan:     Patient is a 77 year old male w/pmh  of HTN, HLD, DM, asthma , memory impairment presenting for  shortness or breath for the past few weeks. He reports no chest pain, LE edema, fever, chills, N/V/D but does have palpitations.  Followed by OP Cardiologist, Dr. Siu.      Problem/Plan -1  Problem: Shortness of Breath  - ECG NSR, RBBB (no previous ECG available to compare)  - Trop nondiagnostic 57 --> 65  - CRP elevated  - CT neg for PE (heart size normal, no pericardial effusion)  - CXR with clear lungs  - pro BNP 93  - Will obtain TTE to assess LV and vavlular function, r/o WMA  - Monitor on tele  - c/w ASA 81mg PO and Atorvastatin 80mg PO daily    Problem/Plan -2  Problem:  Hypertension  - c/w Losartan 100mg PO daily    Problem/Plan -3  Problem: Hyperlipidemia  - Check lipid panel  - c/w Atorvastatin 80mg PO daily    Problem/Plan -4  Problem: Chronic Asthma  - Patient reports SOB a/w this episode was unlike previous asthma exacerbation  - c/w Tadalfil and Symbicort      Differential diagnosis and plan of care discussed with patient after the evaluation. Counseling on diet, nutritional counseling, weight management, exercise and medication compliance was done.   Advanced care planning/advanced directives discussed with patient/family. DNR status including forceful chest compressions to attempt to restart the heart, ventilator support/artificial breathing, electric shock, artificial nutrition, health care proxy, Molst form all discussed with pt. Pt wishes to consider. More than fifteen minutes spent on discussing advanced directives.     Shantal Manuel [facility]  Osmani Candelaria DO [facility]  Cardiovascular Medicine  [address] [place] [address] Dr, [address]  Available for call or text via [employer]  Office [phone]   DATE OF SERVICE: 01-30-23 @ 09:53    CHIEF COMPLAINT:Patient is a 77y old  Male who presents with a chief complaint of sob x few weeks (29 Jan 2023 20:31)      HISTORY OF PRESENT ILLNESS:  Patient is a 77 year old male w/pmh  of HTN, HLD, DM, asthma , memory impairment presenting for  shortness or breath for the past few weeks.   Patient reports recent flu infection about 4 weeks prior to admission. Since that time , patient  reports feeling fatigue , has a  non productive cough, intermittent nasal congestion,   associated with shortness of breath , increased dyspnea with exertion and decreased exercise tolerance .  He reports no chest pain , but does have palpitations , he has no lower extremity edema ,  he reports no fever or chills, no dysuria , no abdominal pain , no nausea or vomiting and no diarrhea.  of note , patient recently returned from a trip to [place]. His son was recently ill with a URI . He also reports increased social stressors during this time.         PAST MEDICAL & SURGICAL HISTORY:  HTN (hypertension)      HLD (hyperlipidemia)      Diabetes      Asthma      ADHD      S/P appendectomy      H/O hernia repair              MEDICATIONS:  aspirin enteric coated 81 milliGRAM(s) Oral daily  losartan 100 milliGRAM(s) Oral daily  tadalafil 5 milliGRAM(s) Oral daily      budesonide 160 MICROgram(s)/formoterol 4.5 MICROgram(s) Inhaler 2 Puff(s) Inhalation two times a day  montelukast 10 milliGRAM(s) Oral daily    acetaminophen     Tablet .. 650 milliGRAM(s) Oral every 6 hours PRN  donepezil 5 milliGRAM(s) Oral at bedtime  melatonin 3 milliGRAM(s) Oral at bedtime PRN  ondansetron Injectable 4 milliGRAM(s) IV Push every 8 hours PRN    aluminum hydroxide/magnesium hydroxide/simethicone Suspension 30 milliLiter(s) Oral every 4 hours PRN  pantoprazole    Tablet 40 milliGRAM(s) Oral before breakfast    atorvastatin 80 milliGRAM(s) Oral at bedtime  dextrose 50% Injectable 25 Gram(s) IV Push once  dextrose 50% Injectable 12.5 Gram(s) IV Push once  dextrose Oral Gel 15 Gram(s) Oral once PRN  glucagon  Injectable 1 milliGRAM(s) IntraMuscular once  insulin glargine Injectable (LANTUS) 16 Unit(s) SubCutaneous at bedtime  insulin lispro (ADMELOG) corrective regimen sliding scale   SubCutaneous three times a day before meals  insulin lispro (ADMELOG) corrective regimen sliding scale   SubCutaneous at bedtime    dextrose 5%. 1000 milliLiter(s) IV Continuous <Continuous>  dextrose 5%. 1000 milliLiter(s) IV Continuous <Continuous>      FAMILY HISTORY:  FH: heart disease (Father)        Non-contributory    SOCIAL HISTORY:    [ ] not a smoker     Allergies    No Known Allergies    Intolerances    	    REVIEW OF SYSTEMS:  CONSTITUTIONAL: + fever  EYES: No eye pain, visual disturbances, or discharge  ENMT:  No difficulty hearing, tinnitus  NECK: No pain or stiffness  RESPIRATORY: No cough, wheezing, +SOB  CARDIOVASCULAR: No chest pain, palpitations, passing out, dizziness, or leg swelling  GASTROINTESTINAL:  No nausea, vomiting, diarrhea or constipation. No melena.  GENITOURINARY: No dysuria, hematuria  NEUROLOGICAL: No stroke like symptoms  SKIN: No burning or lesions   ENDOCRINE: No heat or cold intolerance  MUSCULOSKELETAL: No joint pain or swelling  PSYCHIATRIC: No  anxiety, mood swings  HEME/LYMPH: No bleeding gums  ALLERGY AND IMMUNOLOGIC: No hives or eczema	    All other ROS negative    PHYSICAL EXAM:  T(C): 37.8 (01-30-23 @ 04:53), Max: 38.2 (01-29-23 @ 11:07)  HR: 88 (01-30-23 @ 04:53) (78 - 91)  BP: 142/74 (01-30-23 @ 04:53) (133/81 - 151/75)  RR: 18 (01-30-23 @ 04:53) (18 - 19)  SpO2: 95% (01-30-23 @ 04:53) (95% - 99%)  Wt(kg): --  I&O's Summary      Appearance: Normal	  HEENT:   Normal oral mucosa, EOMI	  Cardiovascular:  S1 S2, No JVD,    Respiratory: Lungs clear to auscultation	  Psychiatry: Alert  Gastrointestinal:  Soft, Non-tender, + BS	  Skin: No rashes   Neurologic: Non-focal  Extremities:  No edema  Vascular: Peripheral pulses palpable    	    	  	  CARDIAC MARKERS:  Labs personally reviewed by me                                  11.4   11.55 )-----------( 365      ( 30 Jan 2023 06:58 )             35.5     01-30    134<L>  |  99  |  11  ----------------------------<  107<H>  3.9   |  24  |  0.54    Ca    9.4      30 Jan 2023 06:58    TPro  7.3  /  Alb  3.4  /  TBili  0.6  /  DBili  x   /  AST  23  /  ALT  19  /  AlkPhos  52  01-30          EKG: Personally reviewed by me - NSR, RBBB   Radiology: Personally reviewed by me -       < from: Xray Chest 1 View- PORTABLE-Urgent (01.29.23 @ 12:32) >  IMPRESSION:  Clear lungs.    < end of copied text >  < from: CT Angio Chest PE Protocol w/ IV Cont (01.29.23 @ 16:30) >  IMPRESSION:  No pulmonary emboli visualized. No source of infection seen.    No acute intrathoracic or abdominal pathology visualized.    < end of copied text >  < from: CT Angio Chest PE Protocol w/ IV Cont (01.29.23 @ 16:30) >  VESSELS: Within normal limits. No pulmonary emboli  HEART: Heart size is normal. No pericardial effusion.      Assessment /Plan:     Patient is a 77 year old male w/pmh  of HTN, HLD, DM, asthma , memory impairment presenting for  shortness or breath for the past few weeks. He reports no chest pain, LE edema, fever, chills, N/V/D but does have palpitations.  Followed by OP Cardiologist, Dr. Siu.      Problem/Plan -1  Problem: Shortness of Breath  - ECG NSR, RBBB (no previous ECG available to compare)  - Trop nondiagnostic 57 --> 65  - CRP elevated  - CT neg for PE (heart size normal, no pericardial effusion)  - CXR with clear lungs  - pro BNP 93  - Will obtain TTE to assess LV and vavlular function, r/o WMA  - c/w ASA 81mg PO and Atorvastatin 80mg PO daily  - given very elevated ESR, FUO and SOB will consider r/o endocarditis     Problem/Plan -2  Problem:  Hypertension  - c/w Losartan 100mg PO daily    Problem/Plan -3  Problem: Hyperlipidemia  - Check lipid panel  - c/w Atorvastatin 80mg PO daily    Problem/Plan -4  Problem: Chronic Asthma  - Patient reports SOB a/w this episode was unlike previous asthma exacerbation  - c/w Tadalfil and Symbicort          Differential diagnosis and plan of care discussed with patient after the evaluation. Counseling on diet, nutritional counseling, weight management, exercise and medication compliance was done.   Advanced care planning/advanced directives discussed with patient/family. DNR status including forceful chest compressions to attempt to restart the heart, ventilator support/artificial breathing, electric shock, artificial nutrition, health care proxy, Molst form all discussed with pt. Pt wishes to consider. More than fifteen minutes spent on discussing advanced directives.     Shantal Manuel [facility]  Osmani Candelaria DO [facility]  Cardiovascular Medicine  [address] [place] [address] Dr, [address]  Available for call or text via [employer]  Office [phone]

## 2023-01-30 NOTE — PROGRESS NOTE ADULT - ASSESSMENT
77 M  w/pmh  of HTN, HLD, DM, asthma , memory impairment presenting for  shortness or breath for the past few weeks.       Elevated troponin.   ·  Plan: suspect type II nstemi in setting of increased stress and infection , however high risk for cardiac disease will require inpatient monitoring and work up pending further discussion with cardiology   - telemetry   - trend cardiac enzymes   - Cardiology consult - to be arranged by day team  - continue asa   - statin.     Problem/Plan - 2:  ·  Problem: Fever.   ·  Plan: no source of infection identified  , RVP neg   , UA neg , CXR clear ,CTA chest/ AP without infectious findings   - monitor off antibiotics   - f/u blood and urine cultures   - serial blood cultures   - sed rate/ CRP   - monitor fever curve.     Problem/Plan - 3:  ·  Problem: Dyspnea.   ·  Plan: bnp wnl , chest imaging w/o PE , no lung pathology noted  , symptoms can be 2/2 deconditioning Vs. anginal equivalent    - Echocardiogram.     Problem/Plan - 4:  ·  Problem: Diabetes mellitus.   ·  Plan: Reduced home dose while inpatient , can uptitrate as tolerates  - Lantus  hs   - Hold oral hypoglycemics  - insulin correction scale  - check fsg qac/qhs  - check a1c in am  - consistent carb diet.     Problem/Plan - 5:  ·  Problem: Asthma.   ·  Plan: - Symbicort   - montelukast   - Duoneb PRN.     Problem/Plan - 6:  ·  Problem: HTN (hypertension).   ·  Plan: - continue losartan.     Problem/Plan - 7:  ·  Problem: ADHD.   ·  Plan: - Dextroamphetamine( non formulary , patients own meds).     Problem/Plan - 8:  ·  Problem: BPH (benign prostatic hyperplasia).   ·  Plan: - Tadalafil ( patients own meds )   - Vibegon ( patients own meds).   77 M  w/pmh  of HTN, HLD, DM, asthma , memory impairment presenting for  shortness or breath for the past few weeks.      Elevated troponin  - ? Type II nstemi in setting of increased stress and influenza infection , however high risk for cardiac disease    - Monitor on telemetry   - trend cardiac enzymes   - Cardiology consult appreciated; F/u recs   - continue asa   - statin.    Fever  - No source of infection identified  , RVP neg   , UA neg , CXR clear ,CTA chest/ AP without infectious findings   - monitor off antibiotics   - UCx negative  - F/u BCx2 -- In lab; F/u results   - If recurrent fevers, check pan cultures   - Elevated ESR and CRP - trend   - Monitor CBC, temp curve, VS and patient closely     Dyspnea/ Shortness or breath  - Bnp wnl , chest imaging w/o PE , no lung pathology noted  , symptoms can be 2/2 deconditioning Vs. anginal equivalent    - TTE pending  - Monitor O2 saturation closely     Diabetes mellitus.   - Reduced home dose while inpatient , can uptitrate as tolerates  - Lantus 16 units QHs  and sliding scale  - Hold oral hypoglycemics  - check fsg qac/qhs  - check a1c -- In lab; F/u results   - consistent carb diet.    Asthma.   - Symbicort   - montelukast   - Duoneb PRN.    HTN (hypertension).   - continue losartan.    HLD  - Statin   - Check lipid panel     ADHD.   - Dextroamphetamine( non formulary , patients own meds).    BPH (benign prostatic hyperplasia).   - Tadalafil ( patients own meds )   - Vibegon ( patients own meds).   77 M  w/pmh  of HTN, HLD, DM, asthma , memory impairment presenting for  shortness or breath for the past few weeks.      Elevated troponin  - ? Type II nstemi in setting of increased stress and influenza infection, however high risk for cardiac disease    - Monitor on telemetry   - trend cardiac enzymes   - Cardiology consult appreciated; F/u recs   - continue asa   - statin.    Fever  - No source of infection identified  , RVP neg   , UA neg , CXR clear ,CTA chest/ AP without infectious findings   - monitor off antibiotics   - UCx negative  - F/u BCx2 -- In lab; F/u results   - If recurrent fevers, check pan cultures   - Elevated ESR and CRP - trend   - Monitor CBC, temp curve, VS and patient closely     Dyspnea/ Shortness or breath  - Bnp wnl , chest imaging w/o PE , no lung pathology noted  , symptoms can be 2/2 deconditioning Vs. anginal equivalent    - TTE pending  - Monitor O2 saturation closely     Diabetes mellitus.   - Reduced home dose while inpatient , can uptitrate as tolerates  - Lantus 16 units QHs  and sliding scale  - Hold oral hypoglycemics  - check fsg qac/qhs  - check a1c -- In lab; F/u results   - consistent carb diet.    Asthma.   - Symbicort   - montelukast   - Duoneb PRN.    HTN (hypertension).   - continue losartan.    HLD  - Statin   - Check lipid panel     ADHD.   - Dextroamphetamine( non formulary , patients own meds).    BPH (benign prostatic hyperplasia).   - Tadalafil ( patients own meds )   - Vibegon ( patients own meds).      PPX  - Heparin 5K Q 8   - PPI

## 2023-01-31 LAB
-  STREPTOCOCCUS SP. (NOT GRP A, B OR S PNEUMONIAE): SIGNIFICANT CHANGE UP
CHOLEST SERPL-MCNC: 113 MG/DL — SIGNIFICANT CHANGE UP
CULTURE RESULTS: SIGNIFICANT CHANGE UP
GRAM STN FLD: SIGNIFICANT CHANGE UP
HCT VFR BLD CALC: 35.4 % — LOW (ref 39–50)
HDLC SERPL-MCNC: 23 MG/DL — LOW
HGB BLD-MCNC: 11.4 G/DL — LOW (ref 13–17)
LIPID PNL WITH DIRECT LDL SERPL: 69 MG/DL — SIGNIFICANT CHANGE UP
MCHC RBC-ENTMCNC: 26 PG — LOW (ref 27–34)
MCHC RBC-ENTMCNC: 32.2 GM/DL — SIGNIFICANT CHANGE UP (ref 32–36)
MCV RBC AUTO: 80.8 FL — SIGNIFICANT CHANGE UP (ref 80–100)
METHOD TYPE: SIGNIFICANT CHANGE UP
NON HDL CHOLESTEROL: 90 MG/DL — SIGNIFICANT CHANGE UP
NRBC # BLD: 0 /100 WBCS — SIGNIFICANT CHANGE UP (ref 0–0)
ORGANISM # SPEC MICROSCOPIC CNT: SIGNIFICANT CHANGE UP
ORGANISM # SPEC MICROSCOPIC CNT: SIGNIFICANT CHANGE UP
PLATELET # BLD AUTO: 371 K/UL — SIGNIFICANT CHANGE UP (ref 150–400)
RBC # BLD: 4.38 M/UL — SIGNIFICANT CHANGE UP (ref 4.2–5.8)
RBC # FLD: 15.2 % — HIGH (ref 10.3–14.5)
SPECIMEN SOURCE: SIGNIFICANT CHANGE UP
TRIGL SERPL-MCNC: 104 MG/DL — SIGNIFICANT CHANGE UP
WBC # BLD: 11.56 K/UL — HIGH (ref 3.8–10.5)
WBC # FLD AUTO: 11.56 K/UL — HIGH (ref 3.8–10.5)

## 2023-01-31 PROCEDURE — 99223 1ST HOSP IP/OBS HIGH 75: CPT

## 2023-01-31 PROCEDURE — 93306 TTE W/DOPPLER COMPLETE: CPT | Mod: 26

## 2023-01-31 RX ORDER — CEFTRIAXONE 500 MG/1
2000 INJECTION, POWDER, FOR SOLUTION INTRAMUSCULAR; INTRAVENOUS EVERY 24 HOURS
Refills: 0 | Status: DISCONTINUED | OUTPATIENT
Start: 2023-01-31 | End: 2023-02-06

## 2023-01-31 RX ORDER — CEFTRIAXONE 500 MG/1
INJECTION, POWDER, FOR SOLUTION INTRAMUSCULAR; INTRAVENOUS
Refills: 0 | Status: DISCONTINUED | OUTPATIENT
Start: 2023-01-31 | End: 2023-01-31

## 2023-01-31 RX ORDER — CEFTRIAXONE 500 MG/1
1000 INJECTION, POWDER, FOR SOLUTION INTRAMUSCULAR; INTRAVENOUS ONCE
Refills: 0 | Status: COMPLETED | OUTPATIENT
Start: 2023-01-31 | End: 2023-01-31

## 2023-01-31 RX ADMIN — CEFTRIAXONE 100 MILLIGRAM(S): 500 INJECTION, POWDER, FOR SOLUTION INTRAMUSCULAR; INTRAVENOUS at 11:24

## 2023-01-31 RX ADMIN — HEPARIN SODIUM 5000 UNIT(S): 5000 INJECTION INTRAVENOUS; SUBCUTANEOUS at 13:24

## 2023-01-31 RX ADMIN — DONEPEZIL HYDROCHLORIDE 5 MILLIGRAM(S): 10 TABLET, FILM COATED ORAL at 21:07

## 2023-01-31 RX ADMIN — LOSARTAN POTASSIUM 100 MILLIGRAM(S): 100 TABLET, FILM COATED ORAL at 05:31

## 2023-01-31 RX ADMIN — Medication 1: at 09:13

## 2023-01-31 RX ADMIN — Medication 3 MILLIGRAM(S): at 21:12

## 2023-01-31 RX ADMIN — Medication 81 MILLIGRAM(S): at 11:24

## 2023-01-31 RX ADMIN — BUDESONIDE AND FORMOTEROL FUMARATE DIHYDRATE 2 PUFF(S): 160; 4.5 AEROSOL RESPIRATORY (INHALATION) at 05:31

## 2023-01-31 RX ADMIN — HEPARIN SODIUM 5000 UNIT(S): 5000 INJECTION INTRAVENOUS; SUBCUTANEOUS at 05:30

## 2023-01-31 RX ADMIN — TADALAFIL 5 MILLIGRAM(S): 10 TABLET, FILM COATED ORAL at 11:24

## 2023-01-31 RX ADMIN — HEPARIN SODIUM 5000 UNIT(S): 5000 INJECTION INTRAVENOUS; SUBCUTANEOUS at 21:06

## 2023-01-31 RX ADMIN — Medication 1: at 17:37

## 2023-01-31 RX ADMIN — ATORVASTATIN CALCIUM 80 MILLIGRAM(S): 80 TABLET, FILM COATED ORAL at 21:07

## 2023-01-31 RX ADMIN — Medication 30 MILLILITER(S): at 21:12

## 2023-01-31 RX ADMIN — MONTELUKAST 10 MILLIGRAM(S): 4 TABLET, CHEWABLE ORAL at 11:25

## 2023-01-31 RX ADMIN — INSULIN GLARGINE 16 UNIT(S): 100 INJECTION, SOLUTION SUBCUTANEOUS at 22:00

## 2023-01-31 RX ADMIN — BUDESONIDE AND FORMOTEROL FUMARATE DIHYDRATE 2 PUFF(S): 160; 4.5 AEROSOL RESPIRATORY (INHALATION) at 17:37

## 2023-01-31 RX ADMIN — Medication 1: at 13:24

## 2023-01-31 RX ADMIN — PANTOPRAZOLE SODIUM 40 MILLIGRAM(S): 20 TABLET, DELAYED RELEASE ORAL at 05:31

## 2023-01-31 NOTE — CONSULT NOTE ADULT - SUBJECTIVE AND OBJECTIVE BOX
HPI:   77 m with DM, HTN, HLD, asthma , memory impairment presenting for shortness or breath and generalized weakness for the past few weeks, after a recent flu about 4 weeks ago  here febrile to 100.8, WBC: 11  blood cx: strep, repeat negative 1/30  urine cx negative  chest/abd CTA no source of infection  TTE: no reported vegetation      PAST MEDICAL & SURGICAL HISTORY:  HTN (hypertension)      HLD (hyperlipidemia)      Diabetes      Asthma      ADHD      S/P appendectomy      H/O hernia repair          Allergies    No Known Allergies    Intolerances        ANTIMICROBIALS:  cefTRIAXone   IVPB        OTHER MEDS:  acetaminophen     Tablet .. 650 milliGRAM(s) Oral every 6 hours PRN  aluminum hydroxide/magnesium hydroxide/simethicone Suspension 30 milliLiter(s) Oral every 4 hours PRN  aspirin enteric coated 81 milliGRAM(s) Oral daily  atorvastatin 80 milliGRAM(s) Oral at bedtime  budesonide 160 MICROgram(s)/formoterol 4.5 MICROgram(s) Inhaler 2 Puff(s) Inhalation two times a day  dextroamphetamine ER capsule 10 milliGRAM(s) 10 milliGRAM(s) Oral daily  dextrose 5%. 1000 milliLiter(s) IV Continuous <Continuous>  dextrose 5%. 1000 milliLiter(s) IV Continuous <Continuous>  dextrose 50% Injectable 25 Gram(s) IV Push once  dextrose 50% Injectable 12.5 Gram(s) IV Push once  dextrose Oral Gel 15 Gram(s) Oral once PRN  donepezil 5 milliGRAM(s) Oral at bedtime  Gemtesa 75mg Tablet 1 Tablet(s) 1 Tablet(s) Oral daily  glucagon  Injectable 1 milliGRAM(s) IntraMuscular once  heparin   Injectable 5000 Unit(s) SubCutaneous every 8 hours  insulin glargine Injectable (LANTUS) 16 Unit(s) SubCutaneous at bedtime  insulin lispro (ADMELOG) corrective regimen sliding scale   SubCutaneous three times a day before meals  insulin lispro (ADMELOG) corrective regimen sliding scale   SubCutaneous at bedtime  losartan 100 milliGRAM(s) Oral daily  melatonin 3 milliGRAM(s) Oral at bedtime PRN  montelukast 10 milliGRAM(s) Oral daily  ondansetron Injectable 4 milliGRAM(s) IV Push every 8 hours PRN  pantoprazole    Tablet 40 milliGRAM(s) Oral before breakfast  tadalafil 5 milliGRAM(s) Oral daily      SOCIAL HISTORY:  , lives with family  no smoking, alcohol or drug abuse  no recent travel    FAMILY HISTORY:  FH: heart disease (Father)        ROS:    All other systems negative     Constitutional: no fever, no chills, no weight loss, no night sweats  Eye: no eye pain, no redness, no vision changes  ENT:  no sore throat, no rhinorrhea  Cardiovascular:  no chest pain, no palpitation  Respiratory:  no SOB, no cough  GI:  no abd pain, no vomiting, no diarrhea  urinary: no dysuria, no hematuria, no flank pain  : no penile discharge or bleeding  musculoskeletal:  no joint pain, no joint swelling  skin:  no rash  neurology:  no headache, no seizure  psych: no anxiety, no depression     Physical Exam:    General:    NAD, non toxic  Head: atraumatic, normocephalic  Eyes: normal sclera and conjunctiva  ENT:   no oropharyngeal lesions, no LAD, neck supple  Cardio:    regular S1,S2  Respiratory:   clear b/l, no wheezing  abd:   soft, BS +, not tender  :     no CVAT, no suprapubic tenderness, no hartley  Musculoskeletal : no joint swelling, no edema  Skin:    no rash  vascular: no phlebitis  Neurologic:     no focal deficits but impaired memory  psych: normal affect      Drug Dosing Weight  Height (cm): 180.3 (29 Jan 2023 10:36)  Weight (kg): 113.4 (29 Jan 2023 10:36)  BMI (kg/m2): 34.9 (29 Jan 2023 10:36)  BSA (m2): 2.32 (29 Jan 2023 10:36)    Vital Signs Last 24 Hrs  T(F): 97.8 (01-31-23 @ 12:12), Max: 100.8 (01-29-23 @ 11:07)    Vital Signs Last 24 Hrs  HR: 84 (01-31-23 @ 12:12) (84 - 85)  BP: 114/75 (01-31-23 @ 12:12) (114/75 - 131/77)  RR: 18 (01-31-23 @ 12:12)  SpO2: 99% (01-31-23 @ 12:12) (94% - 99%)  Wt(kg): --                          11.4   11.56 )-----------( 371      ( 31 Jan 2023 07:03 )             35.4       01-30    134<L>  |  99  |  11  ----------------------------<  107<H>  3.9   |  24  |  0.54    Ca    9.4      30 Jan 2023 06:58    TPro  7.3  /  Alb  3.4  /  TBili  0.6  /  DBili  x   /  AST  23  /  ALT  19  /  AlkPhos  52  01-30          MICROBIOLOGY:  v  .Blood Blood-Peripheral  01-30-23   No growth to date.  --  --      .Blood Blood-Peripheral  01-30-23   No growth to date.  --  --      Clean Catch Clean Catch (Midstream)  01-29-23   <10,000 CFU/mL Normal Urogenital Tiana  --  --      .Blood Blood-Peripheral  01-29-23   Growth in anaerobic bottle: Gram positive cocci in pairs  ***Blood Panel PCR results on this specimen are available  approximately 3 hours after the Gram stain result.***  Gram stain, PCR, and/or culture results may not always  correspond due to difference in methodologies.  ************************************************************  This PCR assay was performed by multiplex PCR. This  Assay tests for 66 bacterial and resistance gene targets.  Please refer to the [facility] test directory  at [email or web address] for details.  --  Blood Culture PCR      .Blood Blood-Peripheral  01-29-23   No growth to date.  --  --          Rapid RVP Result: NotDetec (01-29 @ 11:48)          RADIOLOGY:    Images independently visualized and reviewed personally,  findings as below    < from: CT Abdomen and Pelvis w/ IV Cont (01.29.23 @ 16:31) >  No pulmonary emboli visualized. No source of infection seen.    No acute intrathoracic or abdominal pathology visualized.      < end of copied text >  < from: Transthoracic Echocardiogram (01.31.23 @ 08:40) >  Conclusions:  1. Calcified trileaflet aortic valve with decreased  opening. Peak transaortic valve gradient equals 38 mm Hg,  mean transaortic valve gradient equals 27 mm Hg, estimated  aortic valve area equals 1.3 sqcm (by continuity equation),  aortic valve velocity time integral equals 73 cm,  consistent with moderate aortic stenosis.  2. Increased relative wall thickness with normal left  ventricular mass index, consistent with concentric left  ventricular remodeling.  3. Normal left ventricular systolic function. No segmental  wall motion abnormalities.  4. Mild diastolic dysfunction (Stage I).  5. Normal right ventricular size and function.    < end of copied text >

## 2023-01-31 NOTE — CONSULT NOTE ADULT - ASSESSMENT
77 m with DM, HTN, HLD, asthma , memory impairment presenting for shortness or breath and generalized weakness for the past few weeks, after a recent flu about 4 weeks ago  here febrile to 100.8, WBC: 11  blood cx: strep, repeat negative 1/30  urine cx negative  chest/abd CTA no source of infection  TTE: no reported vegetation    fever, slight leukocytosis, strep bacteremia, unclear source, chest/abd CTA negative, TTE no veg    * c/w ceftriaxone 2 qd  * will need HAIM  * f/u the repeat blood cx negative for now    The above assessment and plan was discussed with the primary team    Dagmar Steward MD  contact on teams  After 5pm and on weekends call [phone]

## 2023-01-31 NOTE — PROGRESS NOTE ADULT - ASSESSMENT
77 M  w/pmh  of HTN, HLD, DM, asthma , memory impairment presenting for  shortness or breath for the past few weeks.      Elevated troponin  - ? Type II nstemi in setting of increased stress and influenza infection, however high risk for cardiac disease  Vs concern for endocarditis   - Monitor on telemetry   - trend cardiac enzymes   - Cardiology consult appreciated; F/u recs   - continue asa   - statin.    Fever  - No source of infection identified  , RVP neg   , UA neg , CXR clear ,CTA chest/ AP without infectious findings   - monitor off antibiotics   - UCx negative  - F/u BCx2 -- 1 of 2 + for Gram +; Started on Rocephin --> Repeat Cx ordered   - TTE with EF of 53%, mild MR, Mod AS, Mild AR, no Seg WMA,  - If recurrent fevers, check pan cultures   - Elevated ESR and CRP - trend   - Monitor CBC, temp curve, VS and patient closely   - Check HAIM, NPO at midnight    Dyspnea/ Shortness or breath  - Bnp wnl , chest imaging w/o PE , no lung pathology noted  , symptoms can be 2/2 deconditioning Vs. anginal equivalent    - TTE pending  - Monitor O2 saturation closely     Diabetes mellitus.   - Reduced home dose while inpatient , can uptitrate as tolerates  - Lantus 16 units QHs  and sliding scale  - Hold oral hypoglycemics  - check fsg qac/qhs  - check a1c -- 7.0  - consistent carb diet.    Asthma.   - Symbicort   - montelukast   - Duoneb PRN.    HTN (hypertension).   - continue losartan.    HLD  - Statin   - LDL of 69    ADHD.   - Dextroamphetamine( non formulary , patients own meds).    BPH (benign prostatic hyperplasia).   - Tadalafil ( patients own meds )   - Vibegon ( patients own meds).      PPX  - Heparin 5K Q 8   - PPI

## 2023-01-31 NOTE — PROGRESS NOTE ADULT - SUBJECTIVE AND OBJECTIVE BOX
DATE OF SERVICE: 01-31-23 @ 11:22    Patient is a 77y old  Male who presents with a chief complaint of sob x few weeks (30 Jan 2023 13:24)      INTERVAL HISTORY: Feels ok.     REVIEW OF SYSTEMS:  CONSTITUTIONAL: No weakness  EYES/ENT: No visual changes;  No throat pain   NECK: No pain or stiffness  RESPIRATORY: No cough, wheezing; No shortness of breath  CARDIOVASCULAR: No chest pain or palpitations  GASTROINTESTINAL: No abdominal  pain. No nausea, vomiting, or hematemesis  GENITOURINARY: No dysuria, frequency or hematuria  NEUROLOGICAL: No stroke like symptoms  SKIN: No rashes    TELEMETRY Personally reviewed: SR 80-90  	  MEDICATIONS:  losartan 100 milliGRAM(s) Oral daily  tadalafil 5 milliGRAM(s) Oral daily        PHYSICAL EXAM:  T(C): 37.1 (01-31-23 @ 04:11), Max: 37.1 (01-31-23 @ 04:11)  HR: 84 (01-31-23 @ 04:11) (84 - 85)  BP: 131/77 (01-31-23 @ 04:11) (117/69 - 131/77)  RR: 18 (01-31-23 @ 04:11) (18 - 18)  SpO2: 94% (01-31-23 @ 04:11) (94% - 99%)  Wt(kg): --  I&O's Summary        Appearance: In no distress	  HEENT:    PERRL, EOMI	  Cardiovascular:  S1 S2, No JVD  Respiratory: Lungs clear to auscultation	  Gastrointestinal:  Soft, Non-tender, + BS	  Vascularature:  No edema of LE  Psychiatric: Appropriate affect   Neuro: no acute focal deficits                               11.4   11.56 )-----------( 371      ( 31 Jan 2023 07:03 )             35.4     01-30    134<L>  |  99  |  11  ----------------------------<  107<H>  3.9   |  24  |  0.54    Ca    9.4      30 Jan 2023 06:58    TPro  7.3  /  Alb  3.4  /  TBili  0.6  /  DBili  x   /  AST  23  /  ALT  19  /  AlkPhos  52  01-30        Labs personally reviewed  < from: Transthoracic Echocardiogram (01.31.23 @ 08:40) >  Conclusions:  1. Calcified trileaflet aortic valve with decreased  opening. Peak transaortic valve gradient equals 38 mm Hg,  mean transaortic valve gradient equals 27 mm Hg, estimated  aortic valve area equals 1.3 sqcm (by continuity equation),  aortic valve velocity time integral equals 73 cm,  consistent with moderate aortic stenosis.  2. Increased relative wall thickness with normal left  ventricular mass index, consistent with concentric left  ventricular remodeling.  3. Normal left ventricular systolic function. No segmental  wall motion abnormalities.  4. Mild diastolic dysfunction (Stage I).  5. Normal right ventricular size and function.  *** No previous Echo exam.    < end of copied text >      ASSESSMENT/PLAN: 	    Patient is a 77 year old male w/pmh  of HTN, HLD, DM, asthma , memory impairment presenting for  shortness or breath for the past few weeks. He reports no chest pain, LE edema, fever, chills, N/V/D but does have palpitations.  Followed by OP Cardiologist, Dr. Siu.      Problem/Plan -1  Problem: Shortness of Breath  - ECG NSR, RBBB (no previous ECG available to compare)  - Trop nondiagnostic 57 --> 65  - CRP elevated  - CT neg for PE (heart size normal, no pericardial effusion)  - CXR with clear lungs  - pro BNP 93  - TTE shows EF53%, moderate AS, concentric LV remodeling, no WMA, mild DD, normal RV  - c/w ASA 81mg PO and Atorvastatin 80mg PO daily  - given very elevated ESR, FUO and SOB will consider r/o endocarditis   - Follow up repeat Blood Cx. Poss HAIM tomorrow.     Problem/Plan -2  Problem:  Hypertension  - c/w Losartan 100mg PO daily    Problem/Plan -3  Problem: Hyperlipidemia  - LDL 69  - c/w Atorvastatin 80mg PO daily    Problem/Plan -4  Problem: Chronic Asthma  - Patient reports SOB a/w this episode was unlike previous asthma exacerbation  - c/w Tadalfil and Symbicort          CIARA Mancini-ASHLEY Candelaria,  [facility]  Cardiovascular Medicine  [address], [address]  Available through call or text on [employer]  Office: [phone]   DATE OF SERVICE: 01-31-23 @ 11:22    Patient is a 77y old  Male who presents with a chief complaint of sob x few weeks (30 Jan 2023 13:24)      INTERVAL HISTORY: Feels ok.     REVIEW OF SYSTEMS:  CONSTITUTIONAL: No weakness  EYES/ENT: No visual changes;  No throat pain   NECK: No pain or stiffness  RESPIRATORY: No cough, wheezing; No shortness of breath  CARDIOVASCULAR: No chest pain or palpitations  GASTROINTESTINAL: No abdominal  pain. No nausea, vomiting, or hematemesis  GENITOURINARY: No dysuria, frequency or hematuria  NEUROLOGICAL: No stroke like symptoms  SKIN: No rashes    TELEMETRY Personally reviewed: SR 80-90  	  MEDICATIONS:  losartan 100 milliGRAM(s) Oral daily  tadalafil 5 milliGRAM(s) Oral daily        PHYSICAL EXAM:  T(C): 37.1 (01-31-23 @ 04:11), Max: 37.1 (01-31-23 @ 04:11)  HR: 84 (01-31-23 @ 04:11) (84 - 85)  BP: 131/77 (01-31-23 @ 04:11) (117/69 - 131/77)  RR: 18 (01-31-23 @ 04:11) (18 - 18)  SpO2: 94% (01-31-23 @ 04:11) (94% - 99%)  Wt(kg): --  I&O's Summary        Appearance: In no distress	  HEENT:    PERRL, EOMI	  Cardiovascular:  S1 S2, No JVD  Respiratory: Lungs clear to auscultation	  Gastrointestinal:  Soft, Non-tender, + BS	  Vascularature:  No edema of LE  Psychiatric: Appropriate affect   Neuro: no acute focal deficits                               11.4   11.56 )-----------( 371      ( 31 Jan 2023 07:03 )             35.4     01-30    134<L>  |  99  |  11  ----------------------------<  107<H>  3.9   |  24  |  0.54    Ca    9.4      30 Jan 2023 06:58    TPro  7.3  /  Alb  3.4  /  TBili  0.6  /  DBili  x   /  AST  23  /  ALT  19  /  AlkPhos  52  01-30        Labs personally reviewed  < from: Transthoracic Echocardiogram (01.31.23 @ 08:40) >  Conclusions:  1. Calcified trileaflet aortic valve with decreased  opening. Peak transaortic valve gradient equals 38 mm Hg,  mean transaortic valve gradient equals 27 mm Hg, estimated  aortic valve area equals 1.3 sqcm (by continuity equation),  aortic valve velocity time integral equals 73 cm,  consistent with moderate aortic stenosis.  2. Increased relative wall thickness with normal left  ventricular mass index, consistent with concentric left  ventricular remodeling.  3. Normal left ventricular systolic function. No segmental  wall motion abnormalities.  4. Mild diastolic dysfunction (Stage I).  5. Normal right ventricular size and function.  *** No previous Echo exam.    < end of copied text >      ASSESSMENT/PLAN: 	    Patient is a 77 year old male w/pmh  of HTN, HLD, DM, asthma , memory impairment presenting for  shortness or breath for the past few weeks. He reports no chest pain, LE edema, fever, chills, N/V/D but does have palpitations.  Followed by OP Cardiologist, Dr. Siu.      Problem/Plan -1  Problem: Shortness of Breath  - ECG NSR, RBBB (no previous ECG available to compare)  - Trop nondiagnostic 57 --> 65  - CRP elevated  - CT neg for PE (heart size normal, no pericardial effusion)  - CXR with clear lungs  - pro BNP 93  - TTE shows EF53%, moderate AS, concentric LV remodeling, no WMA, mild DD, normal RV  - c/w ASA 81mg PO and Atorvastatin 80mg PO daily  - given very elevated ESR, FUO and SOB will consider r/o endocarditis   - Follow up repeat Blood Cx. HAIM tomorrow.     Problem/Plan -2  Problem:  Hypertension  - c/w Losartan 100mg PO daily    Problem/Plan -3  Problem: Hyperlipidemia  - LDL 69  - c/w Atorvastatin 80mg PO daily    Problem/Plan -4  Problem: Chronic Asthma  - Patient reports SOB a/w this episode was unlike previous asthma exacerbation  - c/w Tadalfil and Symbicort          CIARA Mancini-ASHLEY Candelaria,  [facility]  Cardiovascular Medicine  [address], [address]  Available through call or text on [employer]  Office: [phone]

## 2023-01-31 NOTE — PROGRESS NOTE ADULT - SUBJECTIVE AND OBJECTIVE BOX
Name of Patient : MARY JANE CORONADO  MRN: [id number]  Date of visit: 01-31-23 @ 17:54      Subjective: Patient seen and examined. No new events except as noted.   For TTE today  BCx 1 or 2 +; Started on rocephin     REVIEW OF SYSTEMS:    CONSTITUTIONAL: No weakness, fevers or chills  EYES/ENT: No visual changes;  No vertigo or throat pain   NECK: No pain or stiffness  RESPIRATORY: No cough, wheezing, hemoptysis; No shortness of breath  CARDIOVASCULAR: No chest pain or palpitations  GASTROINTESTINAL: No abdominal or epigastric pain. No nausea, vomiting, or hematemesis; No diarrhea or constipation. No melena or hematochezia.  GENITOURINARY: No dysuria, frequency or hematuria  NEUROLOGICAL: No numbness or weakness  SKIN: No itching, burning, rashes, or lesions   All other review of systems is negative unless indicated above.    MEDICATIONS:  MEDICATIONS  (STANDING):  aspirin enteric coated 81 milliGRAM(s) Oral daily  atorvastatin 80 milliGRAM(s) Oral at bedtime  budesonide 160 MICROgram(s)/formoterol 4.5 MICROgram(s) Inhaler 2 Puff(s) Inhalation two times a day  cefTRIAXone   IVPB 2000 milliGRAM(s) IV Intermittent every 24 hours  dextroamphetamine ER capsule 10 milliGRAM(s) 10 milliGRAM(s) Oral daily  dextrose 5%. 1000 milliLiter(s) (100 mL/Hr) IV Continuous <Continuous>  dextrose 5%. 1000 milliLiter(s) (50 mL/Hr) IV Continuous <Continuous>  dextrose 50% Injectable 25 Gram(s) IV Push once  dextrose 50% Injectable 12.5 Gram(s) IV Push once  donepezil 5 milliGRAM(s) Oral at bedtime  Gemtesa 75mg Tablet 1 Tablet(s) 1 Tablet(s) Oral daily  glucagon  Injectable 1 milliGRAM(s) IntraMuscular once  heparin   Injectable 5000 Unit(s) SubCutaneous every 8 hours  insulin glargine Injectable (LANTUS) 16 Unit(s) SubCutaneous at bedtime  insulin lispro (ADMELOG) corrective regimen sliding scale   SubCutaneous three times a day before meals  insulin lispro (ADMELOG) corrective regimen sliding scale   SubCutaneous at bedtime  losartan 100 milliGRAM(s) Oral daily  montelukast 10 milliGRAM(s) Oral daily  pantoprazole    Tablet 40 milliGRAM(s) Oral before breakfast  tadalafil 5 milliGRAM(s) Oral daily      PHYSICAL EXAM:  T(C): 36.6 (01-31-23 @ 12:12), Max: 37.1 (01-31-23 @ 04:11)  HR: 84 (01-31-23 @ 12:12) (84 - 85)  BP: 114/75 (01-31-23 @ 12:12) (114/75 - 131/77)  RR: 18 (01-31-23 @ 12:12) (18 - 18)  SpO2: 99% (01-31-23 @ 12:12) (94% - 99%)  Wt(kg): --  I&O's Summary        Appearance: Normal, generalized weakness   HEENT:  eyes ar e open   Lymphatic: No lymphadenopathy   Cardiovascular: Normal S1 S2, no JVD  Respiratory: normal effort , clear  Gastrointestinal:  Soft, Non-tender  Skin: No rashes,  warm to touch  Psychiatry:  Mood & affect appropriate  Musculoskeletal: No edema                              11.4   11.56 )-----------( 371      ( 31 Jan 2023 07:03 )             35.4               01-30    134<L>  |  99  |  11  ----------------------------<  107<H>  3.9   |  24  |  0.54    Ca    9.4      30 Jan 2023 06:58    TPro  7.3  /  Alb  3.4  /  TBili  0.6  /  DBili  x   /  AST  23  /  ALT  19  /  AlkPhos  52  01-30                             Culture - Blood (01.30.23 @ 07:08)   Specimen Source: .Blood Blood-Peripheral   Culture Results:   No growth to date.     Culture - Blood (01.30.23 @ 07:07)   Specimen Source: .Blood Blood-Peripheral   Culture Results:   No growth to date.     Culture - Urine (01.29.23 @ 14:27)   Specimen Source: Clean Catch Clean Catch (Midstream)   Culture Results:   <10,000 CFU/mL Normal Urogenital Tiana     Culture - Blood (01.29.23 @ 11:30)   - Streptococcus sp. (Not Grp A, B or S pneumoniae): Detec   Gram Stain:   Growth in anaerobic bottle: Gram positive cocci in pairs   Specimen Source: .Blood Blood-Peripheral   Organism: Blood Culture PCR   Culture Results:   Growth in anaerobic bottle: Streptococcus mutans   Alpha hemolytic strep   (not Strep. pneumoniae or Enterococcus)   Single set isolate, possible contaminant. Contact   Microbiology if susceptibility testing clinically   indicated.     Culture - Blood (01.29.23 @ 11:00)   Specimen Source: .Blood Blood-Peripheral   Culture Results:   No growth to date.     < from: Transthoracic Echocardiogram (01.31.23 @ 08:40) >    Patient name: MARY JANE CORONADO  YOB: 1945   Age: 77 (M)   MR#: [id number]  Study Date: 1/31/2023  [facility]  Study quality: Technically fair  Referring Physician: Vel Barnes MD  Blood Pressure:131/77 mmHg  Height: 180 cm  Weight: 113 kg  BSA: 2.3 m2  ------------------------------------------------------------------------  PROCEDURE: Transthoracic echocardiogram with 2-D, M-Mode  and complete spectral and color flow Doppler.  INDICATION: Dyspnea, unspecified (R06.00)  ------------------------------------------------------------------------  Dimensions:    Normal Values:  LA:     3.6    2.0 - 4.0 cm  Ao:     3.3    2.0 - 3.8 cm  SEPTUM: 1.2    0.6 - 1.2 cm  PWT:    1.1    0.6 - 1.1 cm  LVIDd:  4.0    3.0 - 5.6 cm  LVIDs:  2.5    1.8 - 4.0 cm  Derived variables:  LVMI: 67 g/m2  RWT: 0.55  Fractional short: 38 %  EF (Almaraz Rule): 53 %Doppler Peak Velocity (m/sec):  AoV=3.1  ------------------------------------------------------------------------  Observations:  Mitral Valve: Mitral annular calcification, otherwise  normal mitral valve. Mild mitral regurgitation.  Aortic Valve/Aorta: Calcified trileaflet aortic valve with  decreased opening. Peak transaortic valve gradient equals  38 mm Hg, mean transaortic valve gradient equals 27 mm Hg,  estimated aortic valve area equals 1.3 sqcm (by continuity  equation), aortic valve velocity time integral equals 73  cm, consistent with moderate aortic stenosis. Mild aortic  regurgitation.  Peak left ventricular outflow tract  gradient equals 5 mm Hg, mean gradient is equal to 2 mm Hg,  LVOT velocity time integral equals 22 cm.  Aortic Root: 3.3 cm.  LVOT diameter: 2.3 cm.  Left Atrium: Normal left atrium.  LA volume index = 29  cc/m2.  Left Ventricle: Normal left ventricular systolic function.  No segmental wall motion abnormalities. Increased relative  wall thickness with normal left ventricular mass index,  consistent with concentric left ventricular remodeling.  Mild diastolic dysfunction (Stage I).  Right Heart: Normal right atrium. Normal right ventricular  size and function. Normal tricuspid valve. Minimal  tricuspid regurgitation. Normal pulmonic valve. Minimal  pulmonic regurgitation.  Pericardium/Pleura: Normal pericardium with no pericardial  effusion.  Hemodynamic: Estimated right atrial pressure is 8 mm Hg.  Inadequate tricuspid regurgitation Doppler envelope  precludes estimation of RVSP.  ------------------------------------------------------------------------  Conclusions:  1. Calcified trileaflet aortic valve with decreased  opening. Peak transaortic valve gradient equals 38 mm Hg,  mean transaortic valve gradient equals 27 mm Hg, estimated  aortic valve area equals 1.3 sqcm (by continuity equation),  aortic valve velocity time integral equals 73 cm,  consistent with moderate aortic stenosis.  2. Increased relative wall thickness with normal left  ventricular mass index, consistent with concentric left  ventricular remodeling.  3. Normal left ventricular systolic function. No segmental  wall motion abnormalities.  4. Mild diastolic dysfunction (Stage I).  5. Normal right ventricular size and function.  *** No previous Echo exam.  ------------------------------------------------------------------------  Confirmed on  1/31/2023 - 11:00:56 by LEONORA Diego  ------------------------------------------------------------------------    < end of copied text >

## 2023-02-01 LAB
ALBUMIN SERPL ELPH-MCNC: 3.3 G/DL — SIGNIFICANT CHANGE UP (ref 3.3–5)
ALP SERPL-CCNC: 50 U/L — SIGNIFICANT CHANGE UP (ref 40–120)
ALT FLD-CCNC: 10 U/L — SIGNIFICANT CHANGE UP (ref 10–45)
ANION GAP SERPL CALC-SCNC: 13 MMOL/L — SIGNIFICANT CHANGE UP (ref 5–17)
ANION GAP SERPL CALC-SCNC: 13 MMOL/L — SIGNIFICANT CHANGE UP (ref 5–17)
AST SERPL-CCNC: 14 U/L — SIGNIFICANT CHANGE UP (ref 10–40)
BASOPHILS # BLD AUTO: 0.03 K/UL — SIGNIFICANT CHANGE UP (ref 0–0.2)
BASOPHILS NFR BLD AUTO: 0.3 % — SIGNIFICANT CHANGE UP (ref 0–2)
BILIRUB SERPL-MCNC: 0.4 MG/DL — SIGNIFICANT CHANGE UP (ref 0.2–1.2)
BUN SERPL-MCNC: 13 MG/DL — SIGNIFICANT CHANGE UP (ref 7–23)
BUN SERPL-MCNC: 15 MG/DL — SIGNIFICANT CHANGE UP (ref 7–23)
CALCIUM SERPL-MCNC: 9.5 MG/DL — SIGNIFICANT CHANGE UP (ref 8.4–10.5)
CALCIUM SERPL-MCNC: 9.6 MG/DL — SIGNIFICANT CHANGE UP (ref 8.4–10.5)
CHLORIDE SERPL-SCNC: 100 MMOL/L — SIGNIFICANT CHANGE UP (ref 96–108)
CHLORIDE SERPL-SCNC: 101 MMOL/L — SIGNIFICANT CHANGE UP (ref 96–108)
CO2 SERPL-SCNC: 21 MMOL/L — LOW (ref 22–31)
CO2 SERPL-SCNC: 22 MMOL/L — SIGNIFICANT CHANGE UP (ref 22–31)
CREAT SERPL-MCNC: 0.68 MG/DL — SIGNIFICANT CHANGE UP (ref 0.5–1.3)
CREAT SERPL-MCNC: 0.7 MG/DL — SIGNIFICANT CHANGE UP (ref 0.5–1.3)
CULTURE RESULTS: SIGNIFICANT CHANGE UP
EGFR: 94 ML/MIN/1.73M2 — SIGNIFICANT CHANGE UP
EGFR: 95 ML/MIN/1.73M2 — SIGNIFICANT CHANGE UP
EOSINOPHIL # BLD AUTO: 0.13 K/UL — SIGNIFICANT CHANGE UP (ref 0–0.5)
EOSINOPHIL NFR BLD AUTO: 1.1 % — SIGNIFICANT CHANGE UP (ref 0–6)
GLUCOSE SERPL-MCNC: 124 MG/DL — HIGH (ref 70–99)
GLUCOSE SERPL-MCNC: 184 MG/DL — HIGH (ref 70–99)
GRAM STN FLD: SIGNIFICANT CHANGE UP
HCT VFR BLD CALC: 35.3 % — LOW (ref 39–50)
HCT VFR BLD CALC: 35.9 % — LOW (ref 39–50)
HGB BLD-MCNC: 11.4 G/DL — LOW (ref 13–17)
HGB BLD-MCNC: 11.5 G/DL — LOW (ref 13–17)
IMM GRANULOCYTES NFR BLD AUTO: 0.4 % — SIGNIFICANT CHANGE UP (ref 0–0.9)
LYMPHOCYTES # BLD AUTO: 1.52 K/UL — SIGNIFICANT CHANGE UP (ref 1–3.3)
LYMPHOCYTES # BLD AUTO: 12.8 % — LOW (ref 13–44)
MAGNESIUM SERPL-MCNC: 1.8 MG/DL — SIGNIFICANT CHANGE UP (ref 1.6–2.6)
MCHC RBC-ENTMCNC: 25.6 PG — LOW (ref 27–34)
MCHC RBC-ENTMCNC: 25.6 PG — LOW (ref 27–34)
MCHC RBC-ENTMCNC: 32 GM/DL — SIGNIFICANT CHANGE UP (ref 32–36)
MCHC RBC-ENTMCNC: 32.3 GM/DL — SIGNIFICANT CHANGE UP (ref 32–36)
MCV RBC AUTO: 79.3 FL — LOW (ref 80–100)
MCV RBC AUTO: 80 FL — SIGNIFICANT CHANGE UP (ref 80–100)
MONOCYTES # BLD AUTO: 0.71 K/UL — SIGNIFICANT CHANGE UP (ref 0–0.9)
MONOCYTES NFR BLD AUTO: 6 % — SIGNIFICANT CHANGE UP (ref 2–14)
NEUTROPHILS # BLD AUTO: 9.48 K/UL — HIGH (ref 1.8–7.4)
NEUTROPHILS NFR BLD AUTO: 79.4 % — HIGH (ref 43–77)
NRBC # BLD: 0 /100 WBCS — SIGNIFICANT CHANGE UP (ref 0–0)
NRBC # BLD: 0 /100 WBCS — SIGNIFICANT CHANGE UP (ref 0–0)
ORGANISM # SPEC MICROSCOPIC CNT: SIGNIFICANT CHANGE UP
PHOSPHATE SERPL-MCNC: 3.5 MG/DL — SIGNIFICANT CHANGE UP (ref 2.5–4.5)
PLATELET # BLD AUTO: 389 K/UL — SIGNIFICANT CHANGE UP (ref 150–400)
PLATELET # BLD AUTO: 396 K/UL — SIGNIFICANT CHANGE UP (ref 150–400)
POTASSIUM SERPL-MCNC: 3.5 MMOL/L — SIGNIFICANT CHANGE UP (ref 3.5–5.3)
POTASSIUM SERPL-MCNC: 3.6 MMOL/L — SIGNIFICANT CHANGE UP (ref 3.5–5.3)
POTASSIUM SERPL-SCNC: 3.5 MMOL/L — SIGNIFICANT CHANGE UP (ref 3.5–5.3)
POTASSIUM SERPL-SCNC: 3.6 MMOL/L — SIGNIFICANT CHANGE UP (ref 3.5–5.3)
PROT SERPL-MCNC: 7.6 G/DL — SIGNIFICANT CHANGE UP (ref 6–8.3)
RBC # BLD: 4.45 M/UL — SIGNIFICANT CHANGE UP (ref 4.2–5.8)
RBC # BLD: 4.49 M/UL — SIGNIFICANT CHANGE UP (ref 4.2–5.8)
RBC # FLD: 15.2 % — HIGH (ref 10.3–14.5)
RBC # FLD: 15.3 % — HIGH (ref 10.3–14.5)
SODIUM SERPL-SCNC: 135 MMOL/L — SIGNIFICANT CHANGE UP (ref 135–145)
SODIUM SERPL-SCNC: 135 MMOL/L — SIGNIFICANT CHANGE UP (ref 135–145)
WBC # BLD: 10.54 K/UL — HIGH (ref 3.8–10.5)
WBC # BLD: 11.92 K/UL — HIGH (ref 3.8–10.5)
WBC # FLD AUTO: 10.54 K/UL — HIGH (ref 3.8–10.5)
WBC # FLD AUTO: 11.92 K/UL — HIGH (ref 3.8–10.5)

## 2023-02-01 PROCEDURE — 93320 DOPPLER ECHO COMPLETE: CPT | Mod: 26

## 2023-02-01 PROCEDURE — 93312 ECHO TRANSESOPHAGEAL: CPT | Mod: 26

## 2023-02-01 PROCEDURE — 93325 DOPPLER ECHO COLOR FLOW MAPG: CPT | Mod: 26

## 2023-02-01 PROCEDURE — 93010 ELECTROCARDIOGRAM REPORT: CPT | Mod: 76

## 2023-02-01 PROCEDURE — 99233 SBSQ HOSP IP/OBS HIGH 50: CPT

## 2023-02-01 RX ORDER — POLYETHYLENE GLYCOL 3350 17 G/17G
17 POWDER, FOR SOLUTION ORAL DAILY
Refills: 0 | Status: DISCONTINUED | OUTPATIENT
Start: 2023-02-01 | End: 2023-02-06

## 2023-02-01 RX ORDER — MAGNESIUM SULFATE 500 MG/ML
2 VIAL (ML) INJECTION ONCE
Refills: 0 | Status: COMPLETED | OUTPATIENT
Start: 2023-02-01 | End: 2023-02-01

## 2023-02-01 RX ORDER — METOPROLOL TARTRATE 50 MG
5 TABLET ORAL ONCE
Refills: 0 | Status: COMPLETED | OUTPATIENT
Start: 2023-02-01 | End: 2023-02-01

## 2023-02-01 RX ORDER — SENNA PLUS 8.6 MG/1
2 TABLET ORAL AT BEDTIME
Refills: 0 | Status: DISCONTINUED | OUTPATIENT
Start: 2023-02-01 | End: 2023-02-06

## 2023-02-01 RX ORDER — METOPROLOL TARTRATE 50 MG
25 TABLET ORAL EVERY 12 HOURS
Refills: 0 | Status: DISCONTINUED | OUTPATIENT
Start: 2023-02-01 | End: 2023-02-05

## 2023-02-01 RX ORDER — METOPROLOL TARTRATE 50 MG
12.5 TABLET ORAL
Refills: 0 | Status: DISCONTINUED | OUTPATIENT
Start: 2023-02-01 | End: 2023-02-01

## 2023-02-01 RX ORDER — ALPRAZOLAM 0.25 MG
0.25 TABLET ORAL ONCE
Refills: 0 | Status: DISCONTINUED | OUTPATIENT
Start: 2023-02-01 | End: 2023-02-06

## 2023-02-01 RX ADMIN — HEPARIN SODIUM 5000 UNIT(S): 5000 INJECTION INTRAVENOUS; SUBCUTANEOUS at 14:18

## 2023-02-01 RX ADMIN — BUDESONIDE AND FORMOTEROL FUMARATE DIHYDRATE 2 PUFF(S): 160; 4.5 AEROSOL RESPIRATORY (INHALATION) at 17:51

## 2023-02-01 RX ADMIN — LOSARTAN POTASSIUM 100 MILLIGRAM(S): 100 TABLET, FILM COATED ORAL at 06:08

## 2023-02-01 RX ADMIN — Medication 5 MILLIGRAM(S): at 20:30

## 2023-02-01 RX ADMIN — Medication 25 GRAM(S): at 20:30

## 2023-02-01 RX ADMIN — Medication 81 MILLIGRAM(S): at 14:18

## 2023-02-01 RX ADMIN — INSULIN GLARGINE 16 UNIT(S): 100 INJECTION, SOLUTION SUBCUTANEOUS at 21:20

## 2023-02-01 RX ADMIN — SENNA PLUS 2 TABLET(S): 8.6 TABLET ORAL at 21:32

## 2023-02-01 RX ADMIN — TADALAFIL 5 MILLIGRAM(S): 10 TABLET, FILM COATED ORAL at 14:19

## 2023-02-01 RX ADMIN — MONTELUKAST 10 MILLIGRAM(S): 4 TABLET, CHEWABLE ORAL at 14:18

## 2023-02-01 RX ADMIN — HEPARIN SODIUM 5000 UNIT(S): 5000 INJECTION INTRAVENOUS; SUBCUTANEOUS at 21:33

## 2023-02-01 RX ADMIN — ATORVASTATIN CALCIUM 80 MILLIGRAM(S): 80 TABLET, FILM COATED ORAL at 21:32

## 2023-02-01 RX ADMIN — BUDESONIDE AND FORMOTEROL FUMARATE DIHYDRATE 2 PUFF(S): 160; 4.5 AEROSOL RESPIRATORY (INHALATION) at 06:11

## 2023-02-01 RX ADMIN — HEPARIN SODIUM 5000 UNIT(S): 5000 INJECTION INTRAVENOUS; SUBCUTANEOUS at 06:09

## 2023-02-01 RX ADMIN — DONEPEZIL HYDROCHLORIDE 5 MILLIGRAM(S): 10 TABLET, FILM COATED ORAL at 21:32

## 2023-02-01 RX ADMIN — CEFTRIAXONE 100 MILLIGRAM(S): 500 INJECTION, POWDER, FOR SOLUTION INTRAMUSCULAR; INTRAVENOUS at 14:18

## 2023-02-01 RX ADMIN — Medication 25 MILLIGRAM(S): at 20:57

## 2023-02-01 NOTE — PROVIDER CONTACT NOTE (CRITICAL VALUE NOTIFICATION) - BACKGROUND
Pt A&0x4. Pt admitted for Dyspnea and positive Blood cultures.
Pt admitted for Dyspnea and positive Blood cultures.
Pt admitted for fever, sob, and Malone

## 2023-02-01 NOTE — PROGRESS NOTE ADULT - SUBJECTIVE AND OBJECTIVE BOX
Follow Up:  strep bacteremia    Interval History/ROS: no fever, cough or abd pain, but had tachycardia overnight, the strep identified as strep mutans and repeat cx also positive           Allergies  No Known Allergies        ANTIMICROBIALS:  cefTRIAXone   IVPB 2000 every 24 hours      OTHER MEDS:  acetaminophen     Tablet .. 650 milliGRAM(s) Oral every 6 hours PRN  aluminum hydroxide/magnesium hydroxide/simethicone Suspension 30 milliLiter(s) Oral every 4 hours PRN  aspirin enteric coated 81 milliGRAM(s) Oral daily  atorvastatin 80 milliGRAM(s) Oral at bedtime  budesonide 160 MICROgram(s)/formoterol 4.5 MICROgram(s) Inhaler 2 Puff(s) Inhalation two times a day  dextroamphetamine ER capsule 10 milliGRAM(s) 10 milliGRAM(s) Oral daily  dextrose 5%. 1000 milliLiter(s) IV Continuous <Continuous>  dextrose 5%. 1000 milliLiter(s) IV Continuous <Continuous>  dextrose 50% Injectable 25 Gram(s) IV Push once  dextrose 50% Injectable 12.5 Gram(s) IV Push once  dextrose Oral Gel 15 Gram(s) Oral once PRN  donepezil 5 milliGRAM(s) Oral at bedtime  Gemtesa 75mg Tablet 1 Tablet(s) 1 Tablet(s) Oral daily  glucagon  Injectable 1 milliGRAM(s) IntraMuscular once  heparin   Injectable 5000 Unit(s) SubCutaneous every 8 hours  insulin glargine Injectable (LANTUS) 16 Unit(s) SubCutaneous at bedtime  insulin lispro (ADMELOG) corrective regimen sliding scale   SubCutaneous three times a day before meals  insulin lispro (ADMELOG) corrective regimen sliding scale   SubCutaneous at bedtime  losartan 100 milliGRAM(s) Oral daily  melatonin 3 milliGRAM(s) Oral at bedtime PRN  montelukast 10 milliGRAM(s) Oral daily  ondansetron Injectable 4 milliGRAM(s) IV Push every 8 hours PRN  pantoprazole    Tablet 40 milliGRAM(s) Oral before breakfast  tadalafil 5 milliGRAM(s) Oral daily      Vital Signs Last 24 Hrs  T(C): 36.6 (01 Feb 2023 04:27), Max: 36.7 (31 Jan 2023 20:09)  T(F): 97.8 (01 Feb 2023 04:27), Max: 98.1 (31 Jan 2023 20:09)  HR: 82 (01 Feb 2023 04:27) (82 - 91)  BP: 136/85 (01 Feb 2023 04:27) (110/66 - 136/85)  BP(mean): 88 (31 Jan 2023 12:12) (88 - 88)  RR: 18 (01 Feb 2023 04:27) (18 - 18)  SpO2: 97% (01 Feb 2023 04:27) (95% - 99%)    Parameters below as of 01 Feb 2023 04:27  Patient On (Oxygen Delivery Method): room air        Physical Exam:  General:    NAD,  non toxic  Respiratory:    comfortable on RA  abd:     soft,     no tenderness  :   no CVAT,  no suprapubic tenderness,   no  hartley  Musculoskeletal:   no joint swelling  vascular: no phlebitis  Skin:    no rash                          11.5   10.54 )-----------( 389      ( 01 Feb 2023 06:54 )             35.9       02-01    135  |  101  |  13  ----------------------------<  124<H>  3.6   |  21<L>  |  0.68    Ca    9.6      01 Feb 2023 06:52            MICROBIOLOGY:  v  .Blood Blood-Peripheral  01-31-23   No growth to date.  --  --      .Blood Blood-Peripheral  01-30-23   Growth in anaerobic bottle: Gram positive cocci in pairs  --    Growth in anaerobic bottle: Gram positive cocci in pairs      .Blood Blood-Peripheral  01-30-23   Growth in aerobic bottle: Gram positive cocci in pairs  Growth in anaerobic bottle: Gram Positive Cocci in Pairs and Chains  --    Growth in aerobic bottle: Gram positive cocci in pairs  Growth in anaerobic bottle: Gram Positive Cocci in Pairs and Chains      Clean Catch Clean Catch (Midstream)  01-29-23   <10,000 CFU/mL Normal Urogenital Tiana  --  --      .Blood Blood-Peripheral  01-29-23   Growth in anaerobic bottle: Streptococcus mutans  Alpha hemolytic strep  (not Strep. pneumoniae or Enterococcus)  Single set isolate, possible contaminant. Contact  Microbiology if susceptibility testing clinically  indicated.  Growth in aerobic bottle: Gram positive cocci in pairs  ***Blood Panel PCR results on this specimen are available  approximately 3 hours after the Gram stain result.***  Gram stain, PCR, and/or culture results may not always  correspond due to difference in methodologies.  ************************************************************  This PCR assay was performed by multiplex PCR. This  Assay tests for 66 bacterial and resistance gene targets.  Please refer to the [facility] test directory  at [email or web address] for details.  --  Blood Culture PCR      .Blood Blood-Peripheral  01-29-23   No growth to date.  --  --          Rapid RVP Result: NotDetec (01-29 @ 11:48)        RADIOLOGY:  Images independently visualized and reviewed personally, findings as below  < from: CT Abdomen and Pelvis w/ IV Cont (01.29.23 @ 16:31) >  IMPRESSION:  No pulmonary emboli visualized. No source of infection seen.    No acute intrathoracic or abdominal pathology visualized.      < end of copied text >  < from: Transthoracic Echocardiogram (01.31.23 @ 08:40) >  Conclusions:  1. Calcified trileaflet aortic valve with decreased  opening. Peak transaortic valve gradient equals 38 mm Hg,  mean transaortic valve gradient equals 27 mm Hg, estimated  aortic valve area equals 1.3 sqcm (by continuity equation),  aortic valve velocity time integral equals 73 cm,  consistent with moderate aortic stenosis.  2. Increased relative wall thickness with normal left  ventricular mass index, consistent with concentric left  ventricular remodeling.  3. Normal left ventricular systolic function. No segmental  wall motion abnormalities.  4. Mild diastolic dysfunction (Stage I).  5. Normal right ventricular size and function.  *** No previous Echo exam.    < end of copied text >

## 2023-02-01 NOTE — PROGRESS NOTE ADULT - ASSESSMENT
77 M  w/pmh  of HTN, HLD, DM, asthma , memory impairment presenting for  shortness or breath for the past few weeks.      Elevated troponin  - ? Type II nstemi in setting of increased stress and influenza infection, however high risk for cardiac disease  Vs concern for endocarditis   - Monitor on telemetry   - trend cardiac enzymes   - Cardiology consult appreciated; F/u recs   - continue asa   - statin.    Fever  - Blood cultures +, concern for endocarditis, RVP neg   , UA / UCx neg , CXR clear ,CTA chest/ AP without infectious findings   - F/u BCx2 01/29 w/ gm +, Strep mutans, alpha hemolytic strep  - Repeat BCx2 01/30 Gram +   - F/u Repeat Cx 01/31 -- In lab   - F/u Repeat Cx 02/01   - TTE with EF of 53%, mild MR, Mod AS, Mild AR, no Seg WMA  - If recurrent fevers, check pan cultures   - Elevated ESR and CRP - trend   - C/w ABX as per ID, ID eval appreciated -- On Rocephin 2g Qd   - Monitor CBC, temp curve, VS and patient closely   - Check HAIM     Dyspnea/ Shortness or breath  - Bnp wnl , chest imaging w/o PE , no lung pathology noted  , symptoms can be 2/2 deconditioning Vs. anginal equivalent    - TTE as noted; F/u HAIM   - Monitor O2 saturation closely   - Episode of PAT 01/31-02/01 overnight. Mag and Phos added to AM labs. F/u cardiology recs    Diabetes mellitus.   - Reduced home dose while inpatient , can uptitrate as tolerates  - Lantus 16 units QHs  and sliding scale  - Hold oral hypoglycemics  - check fsg qac/qhs  - check a1c -- 7.0  - consistent carb diet.    Asthma.   - Symbicort   - montelukast   - Duoneb PRN.    HTN (hypertension).   - continue losartan.    HLD  - Statin   - LDL of 69    ADHD.   - Dextroamphetamine( non formulary , patients own meds).    BPH (benign prostatic hyperplasia).   - Tadalafil ( patients own meds )   - Vibegon ( patients own meds).      PPX  - Heparin 5K Q 8   - PPI

## 2023-02-01 NOTE — PROVIDER CONTACT NOTE (CRITICAL VALUE NOTIFICATION) - ACTION/TREATMENT ORDERED:
Pt is currently on IV antibiotics. Will continue to monitor.
Pt is currently on IV antibiotics. Will continue to monitor.
Repeat blood cultures with AM labs.

## 2023-02-01 NOTE — PROVIDER CONTACT NOTE (CRITICAL VALUE NOTIFICATION) - TEST AND RESULT REPORTED:
Blood culture positive: Gram + cocci in pairs in aerobic bottle, Gram+ cocci in pairs and chains in anaerobic bottle.
Growth in anaerobic bottle: gram positive cocci in pairs
Blood culture for 1/30: Positive for growth in anaerobic bottle, Gram+ cocci in pair

## 2023-02-01 NOTE — RAPID RESPONSE TEAM SUMMARY - NSADDTLFINDINGSRRT_GEN_ALL_CORE
HR: 80-170s. Afib w/ RVR and PAT. SBPs: 110s. Patient symptomatically feeling pAfib and PAT. Drawn BMP, coags, cbc, TSH. Patient CHADSVASC at least 2 so likely will need AC. Primary team at bedside; HAIM with Sarabjit's excresence. Cards consulted. IV Lopressor 5mg given followed by lopressor 12.5mg PO and Magnesium 2mg. Patient broke into sinus with PAC

## 2023-02-01 NOTE — PROVIDER CONTACT NOTE (CRITICAL VALUE NOTIFICATION) - ASSESSMENT
Patient stable and afebrile. No s/s of distress noted. No events on telemetry. Pt denies any chest pain discomfort.
Pt A&Ox4 with memory impairment denies chest pain, sob, or other discomfort at time of assessment. VSS.
Patient stable and afebrile. No s/s of distress noted. No events on telemetry. Pt denies any chest pain discomfort.

## 2023-02-01 NOTE — RAPID RESPONSE TEAM SUMMARY - NSSITUATIONBACKGROUNDRRT_GEN_ALL_CORE
77 M  w/pmh  of HTN, HLD, DM, asthma , memory impairment presenting for  shortness or breath. Patient recently came back from [facility]; found to be symptomatic with PAT and afib with rvr from 110s to 170s intermittently and breaking in and out of afib and sinus.

## 2023-02-01 NOTE — CHART NOTE - NSCHARTNOTEFT_GEN_A_CORE
Blood cultures collected on 1/30/23, gram positive cocci in pairs in aerobic bottle and gram positive cocci in pairs and chains in anaerobic bottle. On Ceftriaxone 2 gm, ID is  following. BCX on 1/29/23 also with same species.     Yasemin Urias NP, #[id number]

## 2023-02-01 NOTE — PRE-ANESTHESIA EVALUATION ADULT - NSANTHPMHFT_GEN_ALL_CORE
77 m with DM, HTN, HLD, asthma , memory impairment presenting for shortness or breath and generalized weakness for the past few weeks, after a recent flu about 4 weeks ago

## 2023-02-01 NOTE — PROGRESS NOTE ADULT - SUBJECTIVE AND OBJECTIVE BOX
DATE OF SERVICE: 02-01-23 @ 21:44    Patient is a 77y old  Male who presents with a chief complaint of sob x few weeks (01 Feb 2023 12:27)      INTERVAL HISTORY: feels ok    TELEMETRY Personally reviewed: PAT  	  MEDICATIONS:  losartan 100 milliGRAM(s) Oral daily  metoprolol tartrate 25 milliGRAM(s) Oral every 12 hours  tadalafil 5 milliGRAM(s) Oral daily        PHYSICAL EXAM:  T(C): 36.9 (02-01-23 @ 20:52), Max: 36.9 (02-01-23 @ 20:52)  HR: 92 (02-01-23 @ 20:52) (75 - 92)  BP: 104/69 (02-01-23 @ 20:52) (104/69 - 136/85)  RR: 18 (02-01-23 @ 20:52) (16 - 18)  SpO2: 97% (02-01-23 @ 20:52) (87% - 99%)  Wt(kg): --  I&O's Summary    31 Jan 2023 07:01  -  01 Feb 2023 07:00  --------------------------------------------------------  IN: 30 mL / OUT: 0 mL / NET: 30 mL      Height (cm): 180.3 (02-01 @ 11:36)  Weight (kg): 113.4 (02-01 @ 11:36)  BMI (kg/m2): 34.9 (02-01 @ 11:36)  BSA (m2): 2.32 (02-01 @ 11:36)    Appearance: In no distress	  HEENT:    PERRL, EOMI	  Cardiovascular:  S1 S2, No JVD  Respiratory: Lungs clear to auscultation	  Gastrointestinal:  Soft, Non-tender, + BS	  Vascularature:  No edema of LE  Psychiatric: Appropriate affect   Neuro: no acute focal deficits                               11.4   11.92 )-----------( 396      ( 01 Feb 2023 20:22 )             35.3     02-01    135  |  100  |  15  ----------------------------<  184<H>  3.5   |  22  |  0.70    Ca    9.5      01 Feb 2023 20:22  Phos  3.5     02-01  Mg     1.8     02-01    TPro  7.6  /  Alb  3.3  /  TBili  0.4  /  DBili  x   /  AST  14  /  ALT  10  /  AlkPhos  50  02-01        Labs personally reviewed       from: Transthoracic Echocardiogram (01.31.23 @ 08:40) >  Conclusions:  1. Calcified trileaflet aortic valve with decreased  opening. Peak transaortic valve gradient equals 38 mm Hg,  mean transaortic valve gradient equals 27 mm Hg, estimated  aortic valve area equals 1.3 sqcm (by continuity equation),  aortic valve velocity time integral equals 73 cm,  consistent with moderate aortic stenosis.  2. Increased relative wall thickness with normal left  ventricular mass index, consistent with concentric left  ventricular remodeling.  3. Normal left ventricular systolic function. No segmental  wall motion abnormalities.  4. Mild diastolic dysfunction (Stage I).  5. Normal right ventricular size and function.  *** No previous Echo exam.    < end of copied text >      ASSESSMENT/PLAN: 	    Patient is a 77 year old male w/pmh  of HTN, HLD, DM, asthma , memory impairment presenting for  shortness or breath for the past few weeks. He reports no chest pain, LE edema, fever, chills, N/V/D but does have palpitations.  Followed by OP Cardiologist, Dr. Siu.      Problem/Plan -1  Problem: Shortness of Breath  - ECG NSR, RBBB (no previous ECG available to compare)  - Trop nondiagnostic 57 --> 65  - CRP elevated  - CT neg for PE (heart size normal, no pericardial effusion)  - CXR with clear lungs  - pro BNP 93  - TTE shows EF53%, moderate AS, concentric LV remodeling, no WMA, mild DD, normal RV  - c/w ASA 81mg PO and Atorvastatin 80mg PO daily  - given very elevated ESR, FUO and SOB will consider r/o endocarditis   - Follow up repeat Blood Cx. HAIM today    Problem/Plan -2  Problem:  Hypertension  - c/w Losartan 100mg PO daily    Problem/Plan -3  Problem: Hyperlipidemia  - LDL 69  - c/w Atorvastatin 80mg PO daily    Problem/Plan -4  Problem: PAT  - Start Metoprolol 25mg BID            Osmani Candelaria DO [facility]  Cardiovascular Medicine  [address] [place] [address], [address]  Office: [phone]  Available via Text/call on [employer] Teams

## 2023-02-01 NOTE — PROGRESS NOTE ADULT - SUBJECTIVE AND OBJECTIVE BOX
Name of Patient : MARY JANE CORONADO  MRN: [id number]  Date of visit: 02-01-23 @ 12:27      Subjective: Patient seen and examined. No new events except as noted.   Patient seen earlier this AM. OOB TC  Episode of PAT overnight  Denies CP, palpitations, SOB or dyspnea  Repeat cultures +     REVIEW OF SYSTEMS:    CONSTITUTIONAL: Generalized weakness   EYES/ENT: No visual changes;  No vertigo or throat pain   NECK: No pain or stiffness  RESPIRATORY: No cough, wheezing, hemoptysis; No shortness of breath  CARDIOVASCULAR: No chest pain or palpitations  GASTROINTESTINAL: No abdominal or epigastric pain. No nausea, vomiting, or hematemesis; No diarrhea or constipation. No melena or hematochezia.  GENITOURINARY: No dysuria, frequency or hematuria  NEUROLOGICAL: No numbness or weakness  SKIN: No itching, burning, rashes, or lesions   All other review of systems is negative unless indicated above.    MEDICATIONS:  MEDICATIONS  (STANDING):  aspirin enteric coated 81 milliGRAM(s) Oral daily  atorvastatin 80 milliGRAM(s) Oral at bedtime  budesonide 160 MICROgram(s)/formoterol 4.5 MICROgram(s) Inhaler 2 Puff(s) Inhalation two times a day  cefTRIAXone   IVPB 2000 milliGRAM(s) IV Intermittent every 24 hours  dextroamphetamine ER capsule 10 milliGRAM(s) 10 milliGRAM(s) Oral daily  dextrose 5%. 1000 milliLiter(s) (100 mL/Hr) IV Continuous <Continuous>  dextrose 5%. 1000 milliLiter(s) (50 mL/Hr) IV Continuous <Continuous>  dextrose 50% Injectable 25 Gram(s) IV Push once  dextrose 50% Injectable 12.5 Gram(s) IV Push once  donepezil 5 milliGRAM(s) Oral at bedtime  Gemtesa 75mg Tablet 1 Tablet(s) 1 Tablet(s) Oral daily  glucagon  Injectable 1 milliGRAM(s) IntraMuscular once  heparin   Injectable 5000 Unit(s) SubCutaneous every 8 hours  insulin glargine Injectable (LANTUS) 16 Unit(s) SubCutaneous at bedtime  insulin lispro (ADMELOG) corrective regimen sliding scale   SubCutaneous three times a day before meals  insulin lispro (ADMELOG) corrective regimen sliding scale   SubCutaneous at bedtime  losartan 100 milliGRAM(s) Oral daily  montelukast 10 milliGRAM(s) Oral daily  pantoprazole    Tablet 40 milliGRAM(s) Oral before breakfast  tadalafil 5 milliGRAM(s) Oral daily      PHYSICAL EXAM:  T(C): 36.6 (02-01-23 @ 11:36), Max: 36.7 (01-31-23 @ 20:09)  HR: 82 (02-01-23 @ 11:36) (82 - 91)  BP: 136/85 (02-01-23 @ 11:36) (110/66 - 136/85)  RR: 18 (02-01-23 @ 11:36) (18 - 18)  SpO2: 97% (02-01-23 @ 11:36) (95% - 97%)  Wt(kg): --  I&O's Summary    31 Jan 2023 07:01  -  01 Feb 2023 07:00  --------------------------------------------------------  IN: 30 mL / OUT: 0 mL / NET: 30 mL      Height (cm): 180.3 (02-01 @ 11:36)  Weight (kg): 113.4 (02-01 @ 11:36)  BMI (kg/m2): 34.9 (02-01 @ 11:36)  BSA (m2): 2.32 (02-01 @ 11:36)    Appearance: Normal; OOB TC   HEENT: Eyes are open   Lymphatic: No lymphadenopathy   Cardiovascular: Normal S1 S2, no JVD  Respiratory: normal effort , clear  Gastrointestinal:  Soft, Non-tender  Skin: No rashes,  warm to touch  Psychiatry:  Mood & affect appropriate  Musculoskeletal: No edema        01-31-23 @ 07:01  -  02-01-23 @ 07:00  --------------------------------------------------------  IN: 30 mL / OUT: 0 mL / NET: 30 mL                              11.5   10.54 )-----------( 389      ( 01 Feb 2023 06:54 )             35.9               02-01    135  |  101  |  13  ----------------------------<  124<H>  3.6   |  21<L>  |  0.68    Ca    9.6      01 Feb 2023 06:52                               Culture - Blood (01.31.23 @ 07:02)   Specimen Source: .Blood Blood-Peripheral   Culture Results: No growth to date.     Culture - Blood (01.30.23 @ 07:08)   Gram Stain: Growth in anaerobic bottle: Gram positive cocci in pairs   Specimen Source: .Blood Blood-Peripheral   Culture Results: Growth in anaerobic bottle: Gram positive cocci in pairs     Culture - Blood (01.30.23 @ 07:07)   Gram Stain: Growth in aerobic bottle: Gram positive cocci in pairs   Growth in anaerobic bottle: Gram Positive Cocci in Pairs and Chains   Specimen Source: .Blood Blood-Peripheral   Culture Results: Growth in aerobic bottle: Gram positive cocci in pairs   Growth in anaerobic bottle: Gram Positive Cocci in Pairs and Chains     Culture - Urine (01.29.23 @ 14:27)   Specimen Source: Clean Catch Clean Catch (Midstream)   Culture Results: <10,000 CFU/mL Normal Urogenital Tiana     Culture - Blood (01.29.23 @ 11:30)   - Streptococcus sp. (Not Grp A, B or S pneumoniae): Detec   Gram Stain: Growth in anaerobic bottle: Gram positive cocci in pairs   Growth in aerobic bottle: Gram positive cocci in pairs   Specimen Source: .Blood Blood-Peripheral   Organism: Blood Culture PCR   Culture Results: Growth in anaerobic bottle: Streptococcus mutans   Alpha hemolytic strep   Growth in aerobic bottle: Gram positive cocci in pairs     < from: Transthoracic Echocardiogram (01.31.23 @ 08:40) >    Patient name: MARY JANE COROANDO  YOB: 1945   Age: 77 (M)   MR#: [id number]  Study Date: 1/31/2023  [facility]  Study quality: Technically fair  Referring Physician: Vel Barnes MD  Blood Pressure:131/77 mmHg  Height: 180 cm  Weight: 113 kg  BSA: 2.3 m2  ------------------------------------------------------------------------  PROCEDURE: Transthoracic echocardiogram with 2-D, M-Mode  and complete spectral and color flow Doppler.  INDICATION: Dyspnea, unspecified (R06.00)  ------------------------------------------------------------------------  Dimensions:    Normal Values:  LA:     3.6    2.0 - 4.0 cm  Ao:     3.3    2.0 - 3.8 cm  SEPTUM: 1.2    0.6 - 1.2 cm  PWT:    1.1    0.6 - 1.1 cm  LVIDd:  4.0    3.0 - 5.6 cm  LVIDs:  2.5    1.8 - 4.0 cm  Derived variables:  LVMI: 67 g/m2  RWT: 0.55  Fractional short: 38 %  EF (Almaraz Rule): 53 %Doppler Peak Velocity (m/sec):  AoV=3.1  ------------------------------------------------------------------------  Observations:  Mitral Valve: Mitral annular calcification, otherwise  normal mitral valve. Mild mitral regurgitation.  Aortic Valve/Aorta: Calcified trileaflet aortic valve with  decreased opening. Peak transaortic valve gradient equals  38 mm Hg, mean transaortic valve gradient equals 27 mm Hg,  estimated aortic valve area equals 1.3 sqcm (by continuity  equation), aortic valve velocity time integral equals 73  cm, consistent with moderate aortic stenosis. Mild aortic  regurgitation.  Peak left ventricular outflow tract  gradient equals 5 mm Hg, mean gradient is equal to 2 mm Hg,  LVOT velocity time integral equals 22 cm.  Aortic Root: 3.3 cm.  LVOT diameter: 2.3 cm.  Left Atrium: Normal left atrium.  LA volume index = 29  cc/m2.  Left Ventricle: Normal left ventricular systolic function.  No segmental wall motion abnormalities. Increased relative  wall thickness with normal left ventricular mass index,  consistent with concentric left ventricular remodeling.  Mild diastolic dysfunction (Stage I).  Right Heart: Normal right atrium. Normal right ventricular  size and function. Normal tricuspid valve. Minimal  tricuspid regurgitation. Normal pulmonic valve. Minimal  pulmonic regurgitation.  Pericardium/Pleura: Normal pericardium with no pericardial  effusion.  Hemodynamic: Estimated right atrial pressure is 8 mm Hg.  Inadequate tricuspid regurgitation Doppler envelope  precludes estimation of RVSP.  ------------------------------------------------------------------------  Conclusions:  1. Calcified trileaflet aortic valve with decreased  opening. Peak transaortic valve gradient equals 38 mm Hg,  mean transaortic valve gradient equals 27 mm Hg, estimated  aortic valve area equals 1.3 sqcm (by continuity equation),  aortic valve velocity time integral equals 73 cm,  consistent with moderate aortic stenosis.  2. Increased relative wall thickness with normal left  ventricular mass index, consistent with concentric left  ventricular remodeling.  3. Normal left ventricular systolic function. No segmental  wall motion abnormalities.  4. Mild diastolic dysfunction (Stage I).  5. Normal right ventricular size and function.  *** No previous Echo exam.  ------------------------------------------------------------------------  Confirmed on  1/31/2023 - 11:00:56 by LEONORA Diego  ------------------------------------------------------------------------    < end of copied text >

## 2023-02-01 NOTE — PROGRESS NOTE ADULT - ASSESSMENT
77 m with DM, HTN, HLD, asthma , memory impairment presenting for shortness or breath and generalized weakness for the past few weeks, after a recent flu about 4 weeks ago  here febrile to 100.8, WBC: 11  blood cx: strep, repeat negative 1/30  urine cx negative  chest/abd CTA no source of infection  TTE: no reported vegetation    fever, slight leukocytosis, strep mutans bacteremia, chest/abd CTA negative, TTE no veg, likely dental source, stated that he had a dental cleaning 3 months ago but denied any dental issues  repeat blood cx also p ositive  * f/u the repeat blood cx  * dental eval  * c/w ceftriaxone 2 qd  * will need HAIM      The above assessment and plan was discussed with the primary team    Dagmar Steward MD  contact on teams  After 5pm and on weekends call [phone]

## 2023-02-01 NOTE — PRE-ANESTHESIA EVALUATION ADULT - BP NONINVASIVE DIASTOLIC (MM HG)
85
Quality 130: Documentation Of Current Medications In The Medical Record: Current Medications Documented
Detail Level: Detailed

## 2023-02-01 NOTE — PRE-ANESTHESIA EVALUATION ADULT - NSANTHADDINFOFT_GEN_ALL_CORE
Risks and indications for monitored anesthesia care involving, but not limited to, nausea, aspiration or anaphylaxis requiring endotracheal intubation were discussed. Risks were acknowledged and accepted by patient, all of his questions were answered.

## 2023-02-01 NOTE — PROVIDER CONTACT NOTE (CRITICAL VALUE NOTIFICATION) - SITUATION
Blood culture for 1/30: Positive for growth in anaerobic bottle, Gram+ cocci in pair
Blood culture positive: Gram + cocci in pairs in aerobic bottle, Gram+ cocci in pairs and chains in anaerobic bottle.
Blood cultures resulted Growth in anaerobic bottle: gram positive cocci in pairs

## 2023-02-02 LAB
-  CEFTRIAXONE: SIGNIFICANT CHANGE UP
-  CLINDAMYCIN: SIGNIFICANT CHANGE UP
-  ERYTHROMYCIN: SIGNIFICANT CHANGE UP
-  LEVOFLOXACIN: SIGNIFICANT CHANGE UP
-  PENICILLIN: SIGNIFICANT CHANGE UP
-  VANCOMYCIN: SIGNIFICANT CHANGE UP
ANION GAP SERPL CALC-SCNC: 12 MMOL/L — SIGNIFICANT CHANGE UP (ref 5–17)
BUN SERPL-MCNC: 15 MG/DL — SIGNIFICANT CHANGE UP (ref 7–23)
CALCIUM SERPL-MCNC: 9.5 MG/DL — SIGNIFICANT CHANGE UP (ref 8.4–10.5)
CHLORIDE SERPL-SCNC: 102 MMOL/L — SIGNIFICANT CHANGE UP (ref 96–108)
CO2 SERPL-SCNC: 22 MMOL/L — SIGNIFICANT CHANGE UP (ref 22–31)
CREAT SERPL-MCNC: 0.61 MG/DL — SIGNIFICANT CHANGE UP (ref 0.5–1.3)
CULTURE RESULTS: SIGNIFICANT CHANGE UP
CULTURE RESULTS: SIGNIFICANT CHANGE UP
EGFR: 98 ML/MIN/1.73M2 — SIGNIFICANT CHANGE UP
GLUCOSE SERPL-MCNC: 141 MG/DL — HIGH (ref 70–99)
HCT VFR BLD CALC: 35 % — LOW (ref 39–50)
HGB BLD-MCNC: 11.3 G/DL — LOW (ref 13–17)
MCHC RBC-ENTMCNC: 25.8 PG — LOW (ref 27–34)
MCHC RBC-ENTMCNC: 32.3 GM/DL — SIGNIFICANT CHANGE UP (ref 32–36)
MCV RBC AUTO: 79.9 FL — LOW (ref 80–100)
METHOD TYPE: SIGNIFICANT CHANGE UP
NRBC # BLD: 0 /100 WBCS — SIGNIFICANT CHANGE UP (ref 0–0)
ORGANISM # SPEC MICROSCOPIC CNT: SIGNIFICANT CHANGE UP
ORGANISM # SPEC MICROSCOPIC CNT: SIGNIFICANT CHANGE UP
PLATELET # BLD AUTO: 420 K/UL — HIGH (ref 150–400)
POTASSIUM SERPL-MCNC: 3.9 MMOL/L — SIGNIFICANT CHANGE UP (ref 3.5–5.3)
POTASSIUM SERPL-SCNC: 3.9 MMOL/L — SIGNIFICANT CHANGE UP (ref 3.5–5.3)
RBC # BLD: 4.38 M/UL — SIGNIFICANT CHANGE UP (ref 4.2–5.8)
RBC # FLD: 15.1 % — HIGH (ref 10.3–14.5)
SODIUM SERPL-SCNC: 136 MMOL/L — SIGNIFICANT CHANGE UP (ref 135–145)
SPECIMEN SOURCE: SIGNIFICANT CHANGE UP
TSH SERPL-MCNC: 2.62 UIU/ML — SIGNIFICANT CHANGE UP (ref 0.27–4.2)
WBC # BLD: 12.27 K/UL — HIGH (ref 3.8–10.5)
WBC # FLD AUTO: 12.27 K/UL — HIGH (ref 3.8–10.5)

## 2023-02-02 PROCEDURE — 75557 CARDIAC MRI FOR MORPH: CPT | Mod: 26

## 2023-02-02 PROCEDURE — 99232 SBSQ HOSP IP/OBS MODERATE 35: CPT

## 2023-02-02 RX ORDER — POTASSIUM CHLORIDE 20 MEQ
40 PACKET (EA) ORAL ONCE
Refills: 0 | Status: COMPLETED | OUTPATIENT
Start: 2023-02-02 | End: 2023-02-02

## 2023-02-02 RX ADMIN — DONEPEZIL HYDROCHLORIDE 5 MILLIGRAM(S): 10 TABLET, FILM COATED ORAL at 21:38

## 2023-02-02 RX ADMIN — CEFTRIAXONE 100 MILLIGRAM(S): 500 INJECTION, POWDER, FOR SOLUTION INTRAMUSCULAR; INTRAVENOUS at 13:14

## 2023-02-02 RX ADMIN — BUDESONIDE AND FORMOTEROL FUMARATE DIHYDRATE 2 PUFF(S): 160; 4.5 AEROSOL RESPIRATORY (INHALATION) at 05:40

## 2023-02-02 RX ADMIN — HEPARIN SODIUM 5000 UNIT(S): 5000 INJECTION INTRAVENOUS; SUBCUTANEOUS at 21:37

## 2023-02-02 RX ADMIN — HEPARIN SODIUM 5000 UNIT(S): 5000 INJECTION INTRAVENOUS; SUBCUTANEOUS at 06:33

## 2023-02-02 RX ADMIN — MONTELUKAST 10 MILLIGRAM(S): 4 TABLET, CHEWABLE ORAL at 12:46

## 2023-02-02 RX ADMIN — HEPARIN SODIUM 5000 UNIT(S): 5000 INJECTION INTRAVENOUS; SUBCUTANEOUS at 13:14

## 2023-02-02 RX ADMIN — Medication 81 MILLIGRAM(S): at 12:46

## 2023-02-02 RX ADMIN — TADALAFIL 5 MILLIGRAM(S): 10 TABLET, FILM COATED ORAL at 13:38

## 2023-02-02 RX ADMIN — ATORVASTATIN CALCIUM 80 MILLIGRAM(S): 80 TABLET, FILM COATED ORAL at 21:37

## 2023-02-02 RX ADMIN — Medication 25 MILLIGRAM(S): at 17:27

## 2023-02-02 RX ADMIN — Medication 3 MILLIGRAM(S): at 21:42

## 2023-02-02 RX ADMIN — Medication 40 MILLIEQUIVALENT(S): at 06:06

## 2023-02-02 RX ADMIN — LOSARTAN POTASSIUM 100 MILLIGRAM(S): 100 TABLET, FILM COATED ORAL at 05:40

## 2023-02-02 RX ADMIN — SENNA PLUS 2 TABLET(S): 8.6 TABLET ORAL at 21:37

## 2023-02-02 RX ADMIN — Medication 25 MILLIGRAM(S): at 05:44

## 2023-02-02 RX ADMIN — INSULIN GLARGINE 16 UNIT(S): 100 INJECTION, SOLUTION SUBCUTANEOUS at 22:06

## 2023-02-02 RX ADMIN — BUDESONIDE AND FORMOTEROL FUMARATE DIHYDRATE 2 PUFF(S): 160; 4.5 AEROSOL RESPIRATORY (INHALATION) at 17:50

## 2023-02-02 RX ADMIN — PANTOPRAZOLE SODIUM 40 MILLIGRAM(S): 20 TABLET, DELAYED RELEASE ORAL at 05:40

## 2023-02-02 NOTE — PROGRESS NOTE ADULT - SUBJECTIVE AND OBJECTIVE BOX
DATE OF SERVICE: 02-02-23 @ 20:31    Patient is a 77y old  Male who presents with a chief complaint of sob x few weeks (02 Feb 2023 16:22)      INTERVAL HISTORY: feels ok     MEDICATIONS:  losartan 100 milliGRAM(s) Oral daily  metoprolol tartrate 25 milliGRAM(s) Oral every 12 hours  tadalafil 5 milliGRAM(s) Oral daily        PHYSICAL EXAM:  T(C): 36.6 (02-02-23 @ 17:23), Max: 37.1 (02-02-23 @ 00:52)  HR: 76 (02-02-23 @ 17:23) (73 - 92)  BP: 136/68 (02-02-23 @ 17:23) (104/69 - 148/80)  RR: 18 (02-02-23 @ 12:45) (18 - 18)  SpO2: 95% (02-02-23 @ 12:45) (95% - 97%)  Wt(kg): --  I&O's Summary    01 Feb 2023 07:01  -  02 Feb 2023 07:00  --------------------------------------------------------  IN: 300 mL / OUT: 0 mL / NET: 300 mL          Appearance: In no distress	  HEENT:    PERRL, EOMI	  Cardiovascular:  S1 S2, No JVD  Respiratory: Lungs clear to auscultation	  Gastrointestinal:  Soft, Non-tender, + BS	  Vascularature:  No edema of LE  Psychiatric: Appropriate affect   Neuro: no acute focal deficits                               11.3   12.27 )-----------( 420      ( 02 Feb 2023 07:18 )             35.0     02-02    136  |  102  |  15  ----------------------------<  141<H>  3.9   |  22  |  0.61    Ca    9.5      02 Feb 2023 07:14  Phos  3.5     02-01  Mg     1.8     02-01    TPro  7.6  /  Alb  3.3  /  TBili  0.4  /  DBili  x   /  AST  14  /  ALT  10  /  AlkPhos  50  02-01        Labs personally reviewed      from: Transthoracic Echocardiogram (01.31.23 @ 08:40) >  Conclusions:  1. Calcified trileaflet aortic valve with decreased  opening. Peak transaortic valve gradient equals 38 mm Hg,  mean transaortic valve gradient equals 27 mm Hg, estimated  aortic valve area equals 1.3 sqcm (by continuity equation),  aortic valve velocity time integral equals 73 cm,  consistent with moderate aortic stenosis.  2. Increased relative wall thickness with normal left  ventricular mass index, consistent with concentric left  ventricular remodeling.  3. Normal left ventricular systolic function. No segmental  wall motion abnormalities.  4. Mild diastolic dysfunction (Stage I).  5. Normal right ventricular size and function.  *** No previous Echo exam.    < end of copied text >      ASSESSMENT/PLAN: 	    Patient is a 77 year old male w/pmh  of HTN, HLD, DM, asthma , memory impairment presenting for  shortness or breath for the past few weeks. He reports no chest pain, LE edema, fever, chills, N/V/D but does have palpitations.  Followed by OP Cardiologist, Dr. Siu.      Problem/Plan -1  Problem: Shortness of Breath  - ECG NSR, RBBB (no previous ECG available to compare)  - Trop nondiagnostic 57 --> 65  - CRP elevated  - CT neg for PE (heart size normal, no pericardial effusion)  - CXR with clear lungs  - pro BNP 93  - TTE shows EF53%, moderate AS, concentric LV remodeling, no WMA, mild DD, normal RV  - c/w ASA 81mg PO and Atorvastatin 80mg PO daily  - given very elevated ESR, FUO and SOB will consider r/o endocarditis   - Follow up repeat Blood Cx. HAIM no vegetation  - given typical organism, will treat for endocarditis per ID    Problem/Plan -2  Problem:  Hypertension  - c/w Losartan 100mg PO daily    Problem/Plan -3  Problem: Hyperlipidemia  - LDL 69  - c/w Atorvastatin 80mg PO daily    Problem/Plan -4  Problem: PAT  - Start Metoprolol 25mg BID              Osmani Candelaria DO [facility]  Cardiovascular Medicine  [address], [address]  Office: [phone]  Available via Text/call on Microsoft Teams

## 2023-02-02 NOTE — PROGRESS NOTE ADULT - ASSESSMENT
77 M  w/pmh  of HTN, HLD, DM, asthma , memory impairment presenting for  shortness or breath for the past few weeks.      Elevated troponin  - ? Type II nstemi in setting of increased stress and influenza infection, however high risk for cardiac disease  Vs concern for endocarditis   - Monitor on telemetry   - trend cardiac enzymes   - Cardiology consult appreciated; F/u recs   - continue asa   - statin.    Fever  - Blood cultures +, concern for endocarditis, RVP neg   , UA / UCx neg , CXR clear ,CTA chest/ AP without infectious findings   - F/u BCx2 01/29 w/ gm +, Strep mutans, alpha hemolytic strep  - Repeat BCx2 01/30 Gram + ; Strep mutans   - F/u Repeat Cx 01/31 -- NGTD; F/u final   - F/u Repeat Cx 02/01 -- in lab; F/u results   - TTE with EF of 53%, mild MR, Mod AS, Mild AR, no Seg WMA  - If recurrent fevers, check pan cultures   - Elevated ESR and CRP - trend   - C/w ABX as per ID, ID eval appreciated -- On Rocephin 2g Qd   - Monitor CBC, temp curve, VS and patient closely   - Check HAIM -- As noted with Mod AS, Mild-Mod AR, 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 Monitor lytes for K>4 and Mg > 2      Dyspnea/ Shortness or breath  - Bnp wnl , chest imaging w/o PE , no lung pathology noted  , symptoms can be 2/2 deconditioning Vs. anginal equivalent    - TTE as noted; F/u HAIM   - Monitor O2 saturation closely   - Episode of PAT 01/31-02/01 overnight. Mag and Phos added to AM labs. F/u cardiology recs    Diabetes mellitus.   - Reduced home dose while inpatient , can uptitrate as tolerates  - Lantus 16 units QHs  and sliding scale  - Hold oral hypoglycemics  - check fsg qac/qhs  - check a1c -- 7.0  - consistent carb diet.    Asthma.   - Symbicort   - montelukast   - Duoneb PRN.    HTN (hypertension).   - continue losartan.    HLD  - Statin   - LDL of 69    ADHD.   - Dextroamphetamine( non formulary , patients own meds).    BPH (benign prostatic hyperplasia).   - Tadalafil ( patients own meds )   - Vibegon ( patients own meds).      PPX  - Heparin 5K Q 8   - PPI    77 M  w/pmh  of HTN, HLD, DM, asthma , memory impairment presenting for  shortness or breath for the past few weeks.      Elevated troponin  - ? Type II nstemi in setting of increased stress and influenza infection, however high risk for cardiac disease  Vs concern for endocarditis   - Monitor on telemetry   - trend cardiac enzymes   - Cardiology consult appreciated; F/u recs   - continue asa   - statin.    Fever  - Blood cultures +, concern for endocarditis, RVP neg   , UA / UCx neg , CXR clear ,CTA chest/ AP without infectious findings   - F/u BCx2 01/29 w/ gm +, Strep mutans, alpha hemolytic strep  - Repeat BCx2 01/30 Gram + ; Strep mutans   - F/u Repeat Cx 01/31 -- NGTD; F/u final   - F/u Repeat Cx 02/01 -- in lab; F/u results   - TTE with EF of 53%, mild MR, Mod AS, Mild AR, no Seg WMA  - If recurrent fevers, check pan cultures   - Elevated ESR and CRP - trend   - C/w ABX as per ID, ID eval appreciated -- On Rocephin 2g Qd   - Monitor CBC, temp curve, VS and patient closely   - Check HAIM -- As noted with Mod AS, Mild-Mod AR, 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 Monitor lytes for K>4 and Mg > 2    - CHeck Cardiac MR, Check LE duplex    Dyspnea/ Shortness or breath  - Bnp wnl , chest imaging w/o PE , no lung pathology noted  , symptoms can be 2/2 deconditioning Vs. anginal equivalent    - TTE as noted; F/u HAIM   - Monitor O2 saturation closely   - Episode of PAT 01/31-02/01 overnight. Mag and Phos added to AM labs. F/u cardiology recs    Diabetes mellitus.   - Reduced home dose while inpatient , can uptitrate as tolerates  - Lantus 16 units QHs  and sliding scale  - Hold oral hypoglycemics  - check fsg qac/qhs  - check a1c -- 7.0  - consistent carb diet.    Asthma.   - Symbicort   - montelukast   - Duoneb PRN.    HTN (hypertension).   - continue losartan.    HLD  - Statin   - LDL of 69    ADHD.   - Dextroamphetamine( non formulary , patients own meds).    BPH (benign prostatic hyperplasia).   - Tadalafil ( patients own meds )   - Vibegon ( patients own meds).      PPX  - Heparin 5K Q 8   - PPI

## 2023-02-02 NOTE — PROGRESS NOTE ADULT - SUBJECTIVE AND OBJECTIVE BOX
Name of Patient : MARY JANE CORONADO  MRN: [id number]  Date of visit: 02-02-23        Subjective: Patient seen and examined. No new events except as noted.   Patient seen earlier this AM. S/P RRT overnight due to episode of Afib, RVR  Received IV Lopressor and PO Lopressor   S/P HAIM   EP eval     REVIEW OF SYSTEMS:    CONSTITUTIONAL: RRT overnight; Generalized weakness   EYES/ENT: No visual changes;  No vertigo or throat pain   NECK: No pain or stiffness  RESPIRATORY: No cough, wheezing, hemoptysis; No shortness of breath  CARDIOVASCULAR: + Afib RVR overnight   GASTROINTESTINAL: No abdominal or epigastric pain. No nausea, vomiting, or hematemesis; No diarrhea or constipation. No melena or hematochezia.  GENITOURINARY: No dysuria, frequency or hematuria  NEUROLOGICAL: No numbness or weakness  SKIN: No itching, burning, rashes, or lesions   All other review of systems is negative unless indicated above.    MEDICATIONS:  MEDICATIONS  (STANDING):  ALPRAZolam 0.25 milliGRAM(s) Oral once  aspirin enteric coated 81 milliGRAM(s) Oral daily  atorvastatin 80 milliGRAM(s) Oral at bedtime  budesonide 160 MICROgram(s)/formoterol 4.5 MICROgram(s) Inhaler 2 Puff(s) Inhalation two times a day  cefTRIAXone   IVPB 2000 milliGRAM(s) IV Intermittent every 24 hours  dextroamphetamine ER capsule 10 milliGRAM(s) 10 milliGRAM(s) Oral daily  dextrose 5%. 1000 milliLiter(s) (100 mL/Hr) IV Continuous <Continuous>  dextrose 5%. 1000 milliLiter(s) (50 mL/Hr) IV Continuous <Continuous>  dextrose 50% Injectable 25 Gram(s) IV Push once  dextrose 50% Injectable 12.5 Gram(s) IV Push once  donepezil 5 milliGRAM(s) Oral at bedtime  Gemtesa 75mg Tablet 1 Tablet(s) 1 Tablet(s) Oral daily  glucagon  Injectable 1 milliGRAM(s) IntraMuscular once  heparin   Injectable 5000 Unit(s) SubCutaneous every 8 hours  insulin glargine Injectable (LANTUS) 16 Unit(s) SubCutaneous at bedtime  insulin lispro (ADMELOG) corrective regimen sliding scale   SubCutaneous three times a day before meals  insulin lispro (ADMELOG) corrective regimen sliding scale   SubCutaneous at bedtime  losartan 100 milliGRAM(s) Oral daily  metoprolol tartrate 25 milliGRAM(s) Oral every 12 hours  montelukast 10 milliGRAM(s) Oral daily  pantoprazole    Tablet 40 milliGRAM(s) Oral before breakfast  senna 2 Tablet(s) Oral at bedtime  tadalafil 5 milliGRAM(s) Oral daily      PHYSICAL EXAM:  T(C): 36.9 (02-02-23 @ 04:43), Max: 37.1 (02-02-23 @ 00:52)  HR: 76 (02-02-23 @ 12:45) (73 - 92)  BP: 148/80 (02-02-23 @ 12:45) (104/69 - 148/80)  RR: 18 (02-02-23 @ 12:45) (18 - 18)  SpO2: 95% (02-02-23 @ 12:45) (95% - 97%)  Wt(kg): --  I&O's Summary    01 Feb 2023 07:01  -  02 Feb 2023 07:00  --------------------------------------------------------  IN: 300 mL / OUT: 0 mL / NET: 300 mL          Appearance: Normal	  HEENT: Eyes are open   Lymphatic: No lymphadenopathy   Cardiovascular: Normal S1 S2, no JVD  Respiratory: normal effort , clear  Gastrointestinal:  Soft, Non-tender  Skin: No rashes,  warm to touch  Psychiatry:  Mood & affect appropriate  Musculoskeletal: No edema      02-01-23 @ 07:01  -  02-02-23 @ 07:00  --------------------------------------------------------  IN: 300 mL / OUT: 0 mL / NET: 300 mL                                  11.3   12.27 )-----------( 420      ( 02 Feb 2023 07:18 )             35.0               02-02    136  |  102  |  15  ----------------------------<  141<H>  3.9   |  22  |  0.61    Ca    9.5      02 Feb 2023 07:14  Phos  3.5     02-01  Mg     1.8     02-01    TPro  7.6  /  Alb  3.3  /  TBili  0.4  /  DBili  x   /  AST  14  /  ALT  10  /  AlkPhos  50  02-01                         Culture - Blood (01.31.23 @ 09:44)   Specimen Source: .Blood Blood-Peripheral   Culture Results: No growth to date.     Culture - Blood (01.31.23 @ 07:02)   Specimen Source: .Blood Blood-Peripheral   Culture Results: No growth to date.     Culture - Blood (01.30.23 @ 07:08)   Gram Stain:   Growth in anaerobic bottle: Gram positive cocci in pairs   Growth in aerobic bottle: Gram positive cocci in pairs   Specimen Source: .Blood Blood-Peripheral   Culture Results:   Growth in anaerobic bottle: Streptococcus mutans     Culture - Blood (01.30.23 @ 07:07)   Gram Stain:   Growth in aerobic bottle: Gram positive cocci in pairs   Growth in anaerobic bottle: Gram Positive Cocci in Pairs and Chains   Specimen Source: .Blood Blood-Peripheral   Culture Results:   Growth in aerobic and anaerobic bottles: Streptococcus mutans     < from: Transesophageal Echocardiogram w/o TTE (02.01.23 @ 14:31) >    Patient name: MARY JANE CORONADO  YOB: 1945   Age: 77 (M)   MR#: [id number]  Study Date: 2/1/2023  Location: [address] [place], [place] [id number]: Litzy Fischer M.D.  Study quality: Technically good  Referring Physician: Vel Barnes MD  Blood Pressure: 110/70 mmHg  Height: 180 cm  Weight: 113 kg  BSA: 2.3 m2  Heart Rhythm: Normal sinus  ------------------------------------------------------------------------  PROCEDURE: Transesophageal (HAIM) was performed.  Informed  consent was first obtained for HAIM. The patient was sedated  - see anesthesia record.  The procedure was monitored with  automatic blood pressure monitoring, ECG tracings and pulse  oximetry. The transesophageal probe was placed in the  esophagus posterior to the heart without complications.  INDICATION: Acute and subacute infective endocarditis  (I33.0)  ------------------------------------------------------------------------  Observations:  Mitral Valve: Normal mitral valve. Minimal mitral  regurgitation.  Aortic Valve/Aorta: Moderate aortic stenosis (see recent  transthoracic echocardiogram).  Mild-moderate aortic regurgitation.  A small, calcified, mobile mass is attached to the aortic  valve leaflets on the aortic side of the valve, and is most  consistent with a Lambl's excresence.  Normal size aortic root, arch, and descending thoracic  aorta.  Mild atherosclerosis.  Left Atrium: Normal left atrium.  Left Ventricle: Normal left ventricular internal  dimensions. Concentric hypertrophy.  Normal left ventricular systolic function. No segmental  wall motion abnormalities.  Right Heart: Normal right atrium. Normal right ventricular  size and function.  Normal tricuspid valve.  Normal pulmonic valve. Minimal pulmonic regurgitation.  Pericardium/Pleura: Normal pericardium with no pericardial  effusion.  Hemodynamic: No evidence of pulmonary hypertension.  ------------------------------------------------------------------------  Conclusions:  Normal left ventricular systolic function. No segmental  wall motion abnormalities.  Moderate aortic stenosis (known from recent transthoracic  echo); Mild-moderate aortic regurgitation.  No vegetations seen.  A small, calcified, mobile mass is attached to the aortic  valve leaflets on the aortic side of the valve, and is most  consistent with a Lambl's excresence. It is not consistent  with endocarditis.  ------------------------------------------------------------------------  Revised on 2/1/2023 - 6:00 PM by Dave Kirby MD, DEX  ------------------------------------------------------------------------    < end of copied text >   Name of Patient : MARY JANE CORONADO  MRN: [id number]  Date of visit: 02-02-23        Subjective: Patient seen and examined. No new events except as noted.   Patient seen earlier this AM. S/P RRT overnight due to episode of Afib, RVR  Received IV Lopressor and PO Lopressor   S/P HAIM   EP eval     REVIEW OF SYSTEMS:    CONSTITUTIONAL: RRT overnight; Generalized weakness   EYES/ENT: No visual changes;  No vertigo or throat pain   NECK: No pain or stiffness  RESPIRATORY: No cough, wheezing, hemoptysis; No shortness of breath  CARDIOVASCULAR: + Afib RVR overnight   GASTROINTESTINAL: No abdominal or epigastric pain. No nausea, vomiting, or hematemesis; No diarrhea or constipation. No melena or hematochezia.  GENITOURINARY: No dysuria, frequency or hematuria  NEUROLOGICAL: No numbness or weakness  SKIN: No itching, burning, rashes, or lesions   All other review of systems is negative unless indicated above.    MEDICATIONS:  MEDICATIONS  (STANDING):  ALPRAZolam 0.25 milliGRAM(s) Oral once  aspirin enteric coated 81 milliGRAM(s) Oral daily  atorvastatin 80 milliGRAM(s) Oral at bedtime  budesonide 160 MICROgram(s)/formoterol 4.5 MICROgram(s) Inhaler 2 Puff(s) Inhalation two times a day  cefTRIAXone   IVPB 2000 milliGRAM(s) IV Intermittent every 24 hours  dextroamphetamine ER capsule 10 milliGRAM(s) 10 milliGRAM(s) Oral daily  dextrose 5%. 1000 milliLiter(s) (100 mL/Hr) IV Continuous <Continuous>  dextrose 5%. 1000 milliLiter(s) (50 mL/Hr) IV Continuous <Continuous>  dextrose 50% Injectable 25 Gram(s) IV Push once  dextrose 50% Injectable 12.5 Gram(s) IV Push once  donepezil 5 milliGRAM(s) Oral at bedtime  Gemtesa 75mg Tablet 1 Tablet(s) 1 Tablet(s) Oral daily  glucagon  Injectable 1 milliGRAM(s) IntraMuscular once  heparin   Injectable 5000 Unit(s) SubCutaneous every 8 hours  insulin glargine Injectable (LANTUS) 16 Unit(s) SubCutaneous at bedtime  insulin lispro (ADMELOG) corrective regimen sliding scale   SubCutaneous three times a day before meals  insulin lispro (ADMELOG) corrective regimen sliding scale   SubCutaneous at bedtime  losartan 100 milliGRAM(s) Oral daily  metoprolol tartrate 25 milliGRAM(s) Oral every 12 hours  montelukast 10 milliGRAM(s) Oral daily  pantoprazole    Tablet 40 milliGRAM(s) Oral before breakfast  senna 2 Tablet(s) Oral at bedtime  tadalafil 5 milliGRAM(s) Oral daily      PHYSICAL EXAM:  T(C): 36.9 (02-02-23 @ 04:43), Max: 37.1 (02-02-23 @ 00:52)  HR: 76 (02-02-23 @ 12:45) (73 - 92)  BP: 148/80 (02-02-23 @ 12:45) (104/69 - 148/80)  RR: 18 (02-02-23 @ 12:45) (18 - 18)  SpO2: 95% (02-02-23 @ 12:45) (95% - 97%)  Wt(kg): --  I&O's Summary    01 Feb 2023 07:01  -  02 Feb 2023 07:00  --------------------------------------------------------  IN: 300 mL / OUT: 0 mL / NET: 300 mL          Appearance: Normal	  HEENT: Eyes are open   Lymphatic: No lymphadenopathy   Cardiovascular: Normal S1 S2, no JVD  Respiratory: normal effort , clear  Gastrointestinal:  Soft, Non-tender  Skin: No rashes,  warm to touch  Psychiatry:  Mood & affect appropriate  Musculoskeletal: No edema      02-01-23 @ 07:01  -  02-02-23 @ 07:00  --------------------------------------------------------  IN: 300 mL / OUT: 0 mL / NET: 300 mL                                  11.3   12.27 )-----------( 420      ( 02 Feb 2023 07:18 )             35.0               02-02    136  |  102  |  15  ----------------------------<  141<H>  3.9   |  22  |  0.61    Ca    9.5      02 Feb 2023 07:14  Phos  3.5     02-01  Mg     1.8     02-01    TPro  7.6  /  Alb  3.3  /  TBili  0.4  /  DBili  x   /  AST  14  /  ALT  10  /  AlkPhos  50  02-01                         Culture - Blood (01.31.23 @ 09:44)   Specimen Source: .Blood Blood-Peripheral   Culture Results: No growth to date.     Culture - Blood (01.31.23 @ 07:02)   Specimen Source: .Blood Blood-Peripheral   Culture Results: No growth to date.     Culture - Blood (01.30.23 @ 07:08)   Gram Stain:   Growth in anaerobic bottle: Gram positive cocci in pairs   Growth in aerobic bottle: Gram positive cocci in pairs   Specimen Source: .Blood Blood-Peripheral   Culture Results:   Growth in anaerobic bottle: Streptococcus mutans     Culture - Blood (01.30.23 @ 07:07)   Gram Stain:   Growth in aerobic bottle: Gram positive cocci in pairs   Growth in anaerobic bottle: Gram Positive Cocci in Pairs and Chains   Specimen Source: .Blood Blood-Peripheral   Culture Results:   Growth in aerobic and anaerobic bottles: Streptococcus mutans     < from: Transesophageal Echocardiogram w/o TTE (02.01.23 @ 14:31) >    Patient name: MARY JANE CORONADO  YOB: 1945   Age: 77 (M)   MR#: [id number]  Study Date: 2/1/2023  Location: [address] [place], [place] [id number]: Litzy Fischer M.D.  Study quality: Technically good  Referring Physician: Vel Barnes MD  Blood Pressure: 110/70 mmHg  Height: 180 cm  Weight: 113 kg  BSA: 2.3 m2  Heart Rhythm: Normal sinus  ------------------------------------------------------------------------  PROCEDURE: Transesophageal (HAIM) was performed.  Informed  consent was first obtained for HAIM. The patient was sedated  - see anesthesia record.  The procedure was monitored with  automatic blood pressure monitoring, ECG tracings and pulse  oximetry. The transesophageal probe was placed in the  esophagus posterior to the heart without complications.  INDICATION: Acute and subacute infective endocarditis  (I33.0)  ------------------------------------------------------------------------  Observations:  Mitral Valve: Normal mitral valve. Minimal mitral  regurgitation.  Aortic Valve/Aorta: Moderate aortic stenosis (see recent  transthoracic echocardiogram).  Mild-moderate aortic regurgitation.  A small, calcified, mobile mass is attached to the aortic  valve leaflets on the aortic side of the valve, and is most  consistent with a Lambl's excresence.  Normal size aortic root, arch, and descending thoracic  aorta.  Mild atherosclerosis.  Left Atrium: Normal left atrium.  Left Ventricle: Normal left ventricular internal  dimensions. Concentric hypertrophy.  Normal left ventricular systolic function. No segmental  wall motion abnormalities.  Right Heart: Normal right atrium. Normal right ventricular  size and function.  Normal tricuspid valve.  Normal pulmonic valve. Minimal pulmonic regurgitation.  Pericardium/Pleura: Normal pericardium with no pericardial  effusion.  Hemodynamic: No evidence of pulmonary hypertension.  ------------------------------------------------------------------------  Conclusions:  Normal left ventricular systolic function. No segmental  wall motion abnormalities.  Moderate aortic stenosis (known from recent transthoracic  echo); Mild-moderate aortic regurgitation.  No vegetations seen.  A small, calcified, mobile mass is attached to the aortic  valve leaflets on the aortic side of the valve, and is most  consistent with a Lambl's excresence. It is not consistent  with endocarditis.  ------------------------------------------------------------------------  Revised on 2/1/2023 - 6:00 PM by Dave Kirby MD, DEX  ------------------------------------------------------------------------    < end of copied text >

## 2023-02-02 NOTE — PROGRESS NOTE ADULT - ASSESSMENT
77 m with DM, HTN, HLD, asthma , memory impairment presenting for shortness or breath and generalized weakness for the past few weeks, after a recent flu about 4 weeks ago  here febrile to 100.8, WBC: 11  blood cx: strep, repeat negative 1/30  urine cx negative  chest/abd CTA no source of infection  TTE: no reported vegetation and HAIM read as A small, calcified, mobile mass is attached to the aortic valve leaflets on the aortic side of the valve, and is mostconsistent with a Lambl's excresence. It is not consistent with endocarditis    fever, slight leukocytosis, strep mutans bacteremia, chest/abd CTA negative, TTE no veg, likely dental source, stated that he had a dental cleaning 3 months ago but denied any dental issues  repeat blood cx 1/30  also positive and on 1/31 negative  HAIM read as A small, calcified, mobile on aortic valve leaflets on the aortic side of the valve, and is most consistent with a Lambl's excresence,  not consistent with endocarditis    * pt stated he went down for dental eval and no source but no note from dental yet  * c/w ceftriaxone 2 qd  * pt has high grade strep mutans bacteremia with no clear source, repeat was also positive and HAIM now with a mobile calcified mass on aortic valve which was read as Lambl's excresence,  not consistent with endocarditis but will treat as endocarditis with 4 weeks of IV  * can place a picc line for a 4 week course of ceftriaxone from the negative blood cx  * weekly CBC, CMP while on antibiotics  * f/u with ID in 4 weeks for surveillance blood cx and repeat echo      The above assessment and plan was discussed with the primary team    Dagmar Steward MD  contact on teams  After 5pm and on weekends call [phone]

## 2023-02-02 NOTE — PROGRESS NOTE ADULT - SUBJECTIVE AND OBJECTIVE BOX
Follow Up:  strep bacteremia    Interval History/ROS: no fever, cough or abd pain, but had tachycardia and RRT, the HAIM read as no vegetation but a mobile mass on aortic valve which is a Lambl's excresence        Allergies  No Known Allergies        ANTIMICROBIALS:  cefTRIAXone   IVPB 2000 every 24 hours      OTHER MEDS:  acetaminophen     Tablet .. 650 milliGRAM(s) Oral every 6 hours PRN  ALPRAZolam 0.25 milliGRAM(s) Oral once  aluminum hydroxide/magnesium hydroxide/simethicone Suspension 30 milliLiter(s) Oral every 4 hours PRN  aspirin enteric coated 81 milliGRAM(s) Oral daily  atorvastatin 80 milliGRAM(s) Oral at bedtime  budesonide 160 MICROgram(s)/formoterol 4.5 MICROgram(s) Inhaler 2 Puff(s) Inhalation two times a day  dextroamphetamine ER capsule 10 milliGRAM(s) 10 milliGRAM(s) Oral daily  dextrose 5%. 1000 milliLiter(s) IV Continuous <Continuous>  dextrose 5%. 1000 milliLiter(s) IV Continuous <Continuous>  dextrose 50% Injectable 25 Gram(s) IV Push once  dextrose 50% Injectable 12.5 Gram(s) IV Push once  dextrose Oral Gel 15 Gram(s) Oral once PRN  donepezil 5 milliGRAM(s) Oral at bedtime  Gemtesa 75mg Tablet 1 Tablet(s) 1 Tablet(s) Oral daily  glucagon  Injectable 1 milliGRAM(s) IntraMuscular once  heparin   Injectable 5000 Unit(s) SubCutaneous every 8 hours  insulin glargine Injectable (LANTUS) 16 Unit(s) SubCutaneous at bedtime  insulin lispro (ADMELOG) corrective regimen sliding scale   SubCutaneous three times a day before meals  insulin lispro (ADMELOG) corrective regimen sliding scale   SubCutaneous at bedtime  losartan 100 milliGRAM(s) Oral daily  melatonin 3 milliGRAM(s) Oral at bedtime PRN  metoprolol tartrate 25 milliGRAM(s) Oral every 12 hours  montelukast 10 milliGRAM(s) Oral daily  ondansetron Injectable 4 milliGRAM(s) IV Push every 8 hours PRN  pantoprazole    Tablet 40 milliGRAM(s) Oral before breakfast  polyethylene glycol 3350 17 Gram(s) Oral daily PRN  senna 2 Tablet(s) Oral at bedtime  tadalafil 5 milliGRAM(s) Oral daily      Vital Signs Last 24 Hrs  T(C): 36.9 (02 Feb 2023 04:43), Max: 37.1 (02 Feb 2023 00:52)  T(F): 98.4 (02 Feb 2023 04:43), Max: 98.8 (02 Feb 2023 00:52)  HR: 76 (02 Feb 2023 12:45) (73 - 92)  BP: 148/80 (02 Feb 2023 12:45) (104/69 - 148/80)  BP(mean): --  RR: 18 (02 Feb 2023 12:45) (18 - 18)  SpO2: 95% (02 Feb 2023 12:45) (95% - 97%)    Parameters below as of 02 Feb 2023 12:45  Patient On (Oxygen Delivery Method): room air        Physical Exam:  General:    NAD,  non toxic, sitting on a chair  Respiratory:    comfortable on RA  abd:     soft,     no tenderness  :   no CVAT,  no suprapubic tenderness,   no  hartley  Musculoskeletal:   no joint swelling  vascular: no phlebitis  Skin:    no rash                          11.3   12.27 )-----------( 420      ( 02 Feb 2023 07:18 )             35.0       02-02    136  |  102  |  15  ----------------------------<  141<H>  3.9   |  22  |  0.61    Ca    9.5      02 Feb 2023 07:14  Phos  3.5     02-01  Mg     1.8     02-01    TPro  7.6  /  Alb  3.3  /  TBili  0.4  /  DBili  x   /  AST  14  /  ALT  10  /  AlkPhos  50  02-01          MICROBIOLOGY:  v  .Blood Blood-Peripheral  01-31-23   No growth to date.  --  --      .Blood Blood-Peripheral  01-31-23   No growth to date.  --  --      .Blood Blood-Peripheral  01-30-23   Growth in anaerobic bottle: Streptococcus mutans  See previous culture [id number]  Growth in aerobic bottle: Gram positive cocci in pairs  --    Growth in anaerobic bottle: Gram positive cocci in pairs  Growth in aerobic bottle: Gram positive cocci in pairs      .Blood Blood-Peripheral  01-30-23   Growth in aerobic and anaerobic bottles: Streptococcus mutans  --    Growth in aerobic bottle: Gram positive cocci in pairs  Growth in anaerobic bottle: Gram Positive Cocci in Pairs and Chains      Clean Catch Clean Catch (Midstream)  01-29-23   <10,000 CFU/mL Normal Urogenital Tiana  --  --      .Blood Blood-Peripheral  01-29-23   Growth in anaerobic bottle: Streptococcus mutans  Alpha hemolytic strep  (not Strep. pneumoniae or Enterococcus)  Single set isolate, possible contaminant. Contact  Microbiology if susceptibility testing clinically  indicated.  Growth in aerobic bottle: Gram positive cocci in pairs  ***Blood Panel PCR results on this specimen are available  approximately 3 hours after the Gram stain result.***  Gram stain, PCR, and/or culture results may not always  correspond due to difference in methodologies.  ************************************************************  This PCR assay was performed by multiplex PCR. This  Assay tests for 66 bacterial and resistance gene targets.  Please refer to the [facility] test directory  at [email or web address] for details.  --  Blood Culture PCR      .Blood Blood-Peripheral  01-29-23   Growth in aerobic bottle: Streptococcus mutans  Growth in anaerobic bottle: Gram Positive Cocci in Pairs and Chains  --    Growth in anaerobic bottle: Gram Positive Cocci in Pairs and Chains  Growth in aerobic bottle: Gram Positive Cocci in Pairs and Chains          Rapid RVP Result: NotDetec (01-29 @ 11:48)        RADIOLOGY:  Images independently visualized and reviewed personally, findings as below  < from: MR Cardiac No Cont (02.02.23 @ 12:54) >    IMPRESSION:    1.  Concentric thickening of the left ventricular myocardium.  2.  The ejection fraction measures 65.31%  3.  Aortic regurgitation and aortic stenosis, not quantified.  4.  Late gadolinium enhanced images were not performed.    < end of copied text >  < from: CT Abdomen and Pelvis w/ IV Cont (01.29.23 @ 16:31) >  No pulmonary emboli visualized. No source of infection seen.    No acute intrathoracic or abdominal pathology visualized.    < end of copied text >  < from: Transesophageal Echocardiogram w/o TTE (02.01.23 @ 14:31) >  Conclusions:  Normal left ventricular systolic function. No segmental  wall motion abnormalities.  Moderate aortic stenosis (known from recent transthoracic  echo); Mild-moderate aortic regurgitation.  No vegetations seen.  A small, calcified, mobile mass is attached to the aortic  valve leaflets on the aortic side of the valve, and is most  consistent with a Lambl's excresence. It is not consistent  with endocarditis.    < end of copied text >

## 2023-02-02 NOTE — CHART NOTE - NSCHARTNOTEFT_GEN_A_CORE
RRT called earlier during the night for arrythmia, PAT . Pt symptomatic with palpitations. EKG, Lopressor 5 mg IVP x 1. Pt status improved. Pt denies chest pain, dizziness. D/w cardiology, Dr. Candelaria and lopressor 25 mg 2x day started. Dr. Barnes notified about  RRT by coming off shift day provider. Pt son Roderick was updated about pt's status.  Will monitor closely.    Yasemin Urias NP, #[id number]

## 2023-02-02 NOTE — CONSULT NOTE ADULT - SUBJECTIVE AND OBJECTIVE BOX
EP Attending  HISTORY OF PRESENT ILLNESS: HPI:  Patient is a 77 year old male w/pmh  of HTN, HLD, DM, asthma , memory impairment presenting for  shortness or breath for the past few weeks.   Patient reports recent flu infection about 4 weeks prior to admission. Since that time , patient  reports feeling fatigue , has a  non productive cough, intermittent nasal congestion,   associated with shortness of breath , increased dyspnea with exertion and decreased exercise tolerance .  He reports no chest pain , but does have palpitations , he has no lower extremity edema ,  he reports no fever or chills, no dysuria , no abdominal pain , no nausea or vomiting and no diarrhea.  of note , patient recently returned from a trip to [place]. His son was recently ill with a URI . He also reports increased social stressors during this time.    (29 Jan 2023 20:31)    Mr Ribeiro is a very pleasant 77yoM with self-described anxiety (not treated), and here with shortness of breath. 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  He has been experiencing palpitations for the last few weeks, prior to which no such symptoms.  Has been intermittently lightheaded at home - unrelated to timing of abnormal heartbeats.  No fainting. No orthopnea or leg edema.  Rapid response teams have been called to patient bedside for salvos of fast heartbeats seen on telemetry, which he has associated with "mild palpitations and sense of racing/urgency in his chest".  He's been treated with metoprolol IV.    A 10 pt ROS is otherwise negative.    PAST MEDICAL & SURGICAL HISTORY:  HTN (hypertension)  HLD (hyperlipidemia)  Diabetes  Asthma  ADHD  S/P appendectomy  H/O hernia repair    MEDICATIONS  (STANDING):  ALPRAZolam 0.25 milliGRAM(s) Oral once  aspirin enteric coated 81 milliGRAM(s) Oral daily  atorvastatin 80 milliGRAM(s) Oral at bedtime  budesonide 160 MICROgram(s)/formoterol 4.5 MICROgram(s) Inhaler 2 Puff(s) Inhalation two times a day  cefTRIAXone   IVPB 2000 milliGRAM(s) IV Intermittent every 24 hours  dextroamphetamine ER capsule 10 milliGRAM(s) 10 milliGRAM(s) Oral daily  dextrose 5%. 1000 milliLiter(s) (100 mL/Hr) IV Continuous <Continuous>  dextrose 5%. 1000 milliLiter(s) (50 mL/Hr) IV Continuous <Continuous>  dextrose 50% Injectable 25 Gram(s) IV Push once  dextrose 50% Injectable 12.5 Gram(s) IV Push once  donepezil 5 milliGRAM(s) Oral at bedtime  Gemtesa 75mg Tablet 1 Tablet(s) 1 Tablet(s) Oral daily  glucagon  Injectable 1 milliGRAM(s) IntraMuscular once  heparin   Injectable 5000 Unit(s) SubCutaneous every 8 hours  insulin glargine Injectable (LANTUS) 16 Unit(s) SubCutaneous at bedtime  insulin lispro (ADMELOG) corrective regimen sliding scale   SubCutaneous three times a day before meals  insulin lispro (ADMELOG) corrective regimen sliding scale   SubCutaneous at bedtime  losartan 100 milliGRAM(s) Oral daily  metoprolol tartrate 25 milliGRAM(s) Oral every 12 hours  montelukast 10 milliGRAM(s) Oral daily  pantoprazole    Tablet 40 milliGRAM(s) Oral before breakfast  senna 2 Tablet(s) Oral at bedtime  tadalafil 5 milliGRAM(s) Oral daily    Allergies    No Known Allergies    Intolerances    FAMILY HISTORY:  FH: heart disease (Father)    Non-contributary for premature coronary disease or sudden cardiac death    SOCIAL HISTORY:    [ x] Non-smoker  [ ] Smoker  [ ] Alcohol      PHYSICAL EXAM:  T(C): 36.9 (02-02-23 @ 04:43), Max: 37.1 (02-02-23 @ 00:52)  HR: 73 (02-02-23 @ 04:43) (73 - 92)  BP: 133/81 (02-02-23 @ 04:43) (104/69 - 136/85)  RR: 18 (02-02-23 @ 04:43) (16 - 18)  SpO2: 96% (02-02-23 @ 04:43) (87% - 99%)  Wt(kg): --    Appearance: well appearing adult male in no acute distress, nondiaphoretic	  HEENT:   Normal oral mucosa, PERRL, EOMI	  Lymphatic: No lymphadenopathy , no edema  Cardiovascular: Normal S1 S2, No JVD, No murmurs , Peripheral pulses palpable 2+ bilaterally  Respiratory: Lungs clear to auscultation, normal effort 	  Gastrointestinal:  Soft, Non-tender, + BS	  Skin: No rashes, No ecchymoses, No cyanosis, warm to touch  Musculoskeletal: Normal range of motion, normal strength  Psychiatry:  Mood is "I'm nervous and anxious",  affect appropriate    TELEMETRY: NSR, RBBB, episodes of PAT with same QRS complex.  Episodes terminate spontaneously, but he demonstrates a few episodes back-to-back.	    ECG: NSR, bursts of PAT, RBBB QRS.  Echo:  < from: Transthoracic Echocardiogram (01.31.23 @ 08:40) >  Dimensions:    Normal Values:  LA:     3.6    2.0 - 4.0 cm  Ao:     3.3    2.0 - 3.8 cm  SEPTUM: 1.2    0.6 - 1.2 cm  PWT:    1.1    0.6 - 1.1 cm  LVIDd:  4.0    3.0 - 5.6 cm  LVIDs:  2.5    1.8 - 4.0 cm  Derived variables:  LVMI: 67 g/m2  RWT: 0.55  Fractional short: 38 %  EF (Almaraz Rule): 53 %Doppler Peak Velocity (m/sec):  AoV=3.1  ------------------------------------------------------------------------  Observations:  Mitral Valve: Mitral annular calcification, otherwise  normal mitral valve. Mild mitral regurgitation.  Aortic Valve/Aorta: Calcified trileaflet aortic valve with  decreased opening. Peak transaortic valve gradient equals  38 mm Hg, mean transaortic valve gradient equals 27 mm Hg,  estimated aortic valve area equals 1.3 sqcm (by continuity  equation), aortic valve velocity time integral equals 73  cm, consistent with moderate aortic stenosis. Mild aortic  regurgitation.  Peak left ventricular outflow tract  gradient equals 5 mm Hg, mean gradient is equal to 2 mm Hg,  LVOT velocity time integral equals 22 cm.  Aortic Root: 3.3 cm.  LVOT diameter: 2.3 cm.  Left Atrium: Normal left atrium.  LA volume index = 29  cc/m2.  Left Ventricle: Normal left ventricular systolic function.  No segmental wall motion abnormalities. Increased relative  wall thickness with normal left ventricular mass index,  consistent with concentric left ventricular remodeling.  Mild diastolic dysfunction (Stage I).  Right Heart: Normal right atrium. Normal right ventricular  size and function. Normal tricuspid valve. Minimal  tricuspid regurgitation. Normal pulmonic valve. Minimal  pulmonic regurgitation.  Pericardium/Pleura: Normal pericardium with no pericardial  effusion.  Hemodynamic: Estimated right atrial pressure is 8 mm Hg.  Inadequate tricuspid regurgitation Doppler envelope  precludes estimation of RVSP.    < end of copied text >    < from: Transesophageal Echocardiogram w/o TTE (02.01.23 @ 14:31) >  Observations:  Mitral Valve: Normal mitral valve. Minimal mitral  regurgitation.  Aortic Valve/Aorta: Moderate aortic stenosis (see recent  transthoracic echocardiogram).  Mild-moderate aortic regurgitation.  A small, calcified, mobile mass is attached to the aortic  valve leaflets on the aortic side of the valve, and is most  consistent with a Lambl's excresence.  Normal size aortic root, arch, and descending thoracic  aorta.  Mild atherosclerosis.  Left Atrium: Normal left atrium.  Left Ventricle: Normal left ventricular internal  dimensions. Concentric hypertrophy.  Normal left ventricular systolic function. No segmental  wall motion abnormalities.  Right Heart: Normal right atrium. Normal right ventricular  size and function.  Normal tricuspid valve.  Normal pulmonic valve. Minimal pulmonic regurgitation.  Pericardium/Pleura: Normal pericardium with no pericardial  effusion.  Hemodynamic: No evidence of pulmonary hypertension.    < end of copied text >    	  LABS:	 	                          11.3   12.27 )-----------( 420      ( 02 Feb 2023 07:18 )             35.0     02-02    136  |  102  |  15  ----------------------------<  141<H>  3.9   |  22  |  0.61    Ca    9.5      02 Feb 2023 07:14  Phos  3.5     02-01  Mg     1.8     02-01    TPro  7.6  /  Alb  3.3  /  TBili  0.4  /  DBili  x   /  AST  14  /  ALT  10  /  AlkPhos  50  02-01  TSH: Thyroid Stimulating Hormone, Serum: 2.62 uIU/mL (02-01 @ 20:22)      ASSESSMENT/PLAN: 	Mr Ribeiro is a pleasant 77y Male here for workup of infection. Found to have salvos of PAT on telemetry. He has a RBBB QRS on EKG.  At present, he feels anxious and stressed about his entire lifestyle, but has felt ill for the last week or two, and has been experiencing palpitations during the last 2-4 weeks.  Some unrelated episodes of lightheadedness, but no fainting. Echocardiogram reassuring- LVH without systolic dysfunction, Moderate AS but no endocarditis.  His telemetry reveals episodes of paroxysmal atrial tachycardia for 3-10sec at a time, sometimes with multiple salvos back to back.  These episodes are associated with his palpitations.  No sustained atrial fibrillation at this point. He has HTN, diabetes, but no known prior strokes.    For lack of evidence that ~10-20sec of irregular heartbeats can result in a stroke, he does not have "AFib" yet, and does not yet require oral anticoagulation.  At this time I would not recommend antiarrhythmic drugs.  Maintain telemetry, and maintain K4-4.5 and Mg 2 while hospitalized.  As an outpatient he should undergo cardiac rhythm monitoring (event monitors).  If he has sustained AFib (many minutes in a row), he should be anticoagulated then for stroke prevention.    His hemodynamics will be improved at lower heartrates in the setting of LVH. OK to titrate Metoprolol dose to a resting HR in the 60s.    Will follow with you.  Sees Dr Deleon.    Duc Martínez M.D.  Cardiac Electrophysiology    office [phone]  pager [phone]

## 2023-02-03 LAB
ANION GAP SERPL CALC-SCNC: 13 MMOL/L — SIGNIFICANT CHANGE UP (ref 5–17)
BUN SERPL-MCNC: 11 MG/DL — SIGNIFICANT CHANGE UP (ref 7–23)
CALCIUM SERPL-MCNC: 9.8 MG/DL — SIGNIFICANT CHANGE UP (ref 8.4–10.5)
CHLORIDE SERPL-SCNC: 101 MMOL/L — SIGNIFICANT CHANGE UP (ref 96–108)
CO2 SERPL-SCNC: 23 MMOL/L — SIGNIFICANT CHANGE UP (ref 22–31)
CREAT SERPL-MCNC: 0.56 MG/DL — SIGNIFICANT CHANGE UP (ref 0.5–1.3)
CULTURE RESULTS: SIGNIFICANT CHANGE UP
CULTURE RESULTS: SIGNIFICANT CHANGE UP
EGFR: 101 ML/MIN/1.73M2 — SIGNIFICANT CHANGE UP
GLUCOSE SERPL-MCNC: 106 MG/DL — HIGH (ref 70–99)
GRAM STN FLD: SIGNIFICANT CHANGE UP
HCT VFR BLD CALC: 34.2 % — LOW (ref 39–50)
HGB BLD-MCNC: 10.9 G/DL — LOW (ref 13–17)
MCHC RBC-ENTMCNC: 25.4 PG — LOW (ref 27–34)
MCHC RBC-ENTMCNC: 31.9 GM/DL — LOW (ref 32–36)
MCV RBC AUTO: 79.7 FL — LOW (ref 80–100)
NRBC # BLD: 0 /100 WBCS — SIGNIFICANT CHANGE UP (ref 0–0)
PLATELET # BLD AUTO: 428 K/UL — HIGH (ref 150–400)
POTASSIUM SERPL-MCNC: 4.1 MMOL/L — SIGNIFICANT CHANGE UP (ref 3.5–5.3)
POTASSIUM SERPL-SCNC: 4.1 MMOL/L — SIGNIFICANT CHANGE UP (ref 3.5–5.3)
RBC # BLD: 4.29 M/UL — SIGNIFICANT CHANGE UP (ref 4.2–5.8)
RBC # FLD: 15.3 % — HIGH (ref 10.3–14.5)
SODIUM SERPL-SCNC: 137 MMOL/L — SIGNIFICANT CHANGE UP (ref 135–145)
SPECIMEN SOURCE: SIGNIFICANT CHANGE UP
SPECIMEN SOURCE: SIGNIFICANT CHANGE UP
WBC # BLD: 9.44 K/UL — SIGNIFICANT CHANGE UP (ref 3.8–10.5)
WBC # FLD AUTO: 9.44 K/UL — SIGNIFICANT CHANGE UP (ref 3.8–10.5)

## 2023-02-03 PROCEDURE — 71045 X-RAY EXAM CHEST 1 VIEW: CPT | Mod: 26

## 2023-02-03 PROCEDURE — 99232 SBSQ HOSP IP/OBS MODERATE 35: CPT

## 2023-02-03 PROCEDURE — 93970 EXTREMITY STUDY: CPT | Mod: 26

## 2023-02-03 RX ORDER — CEFTRIAXONE 500 MG/1
2 INJECTION, POWDER, FOR SOLUTION INTRAMUSCULAR; INTRAVENOUS
Qty: 56 | Refills: 0
Start: 2023-02-03 | End: 2023-03-02

## 2023-02-03 RX ORDER — CHLORHEXIDINE GLUCONATE 213 G/1000ML
1 SOLUTION TOPICAL
Refills: 0 | Status: DISCONTINUED | OUTPATIENT
Start: 2023-02-03 | End: 2023-02-06

## 2023-02-03 RX ORDER — SODIUM CHLORIDE 9 MG/ML
10 INJECTION INTRAMUSCULAR; INTRAVENOUS; SUBCUTANEOUS
Refills: 0 | Status: DISCONTINUED | OUTPATIENT
Start: 2023-02-03 | End: 2023-02-06

## 2023-02-03 RX ADMIN — BUDESONIDE AND FORMOTEROL FUMARATE DIHYDRATE 2 PUFF(S): 160; 4.5 AEROSOL RESPIRATORY (INHALATION) at 05:46

## 2023-02-03 RX ADMIN — INSULIN GLARGINE 16 UNIT(S): 100 INJECTION, SOLUTION SUBCUTANEOUS at 22:38

## 2023-02-03 RX ADMIN — HEPARIN SODIUM 5000 UNIT(S): 5000 INJECTION INTRAVENOUS; SUBCUTANEOUS at 05:46

## 2023-02-03 RX ADMIN — MONTELUKAST 10 MILLIGRAM(S): 4 TABLET, CHEWABLE ORAL at 12:26

## 2023-02-03 RX ADMIN — Medication 3 MILLIGRAM(S): at 21:21

## 2023-02-03 RX ADMIN — HEPARIN SODIUM 5000 UNIT(S): 5000 INJECTION INTRAVENOUS; SUBCUTANEOUS at 14:54

## 2023-02-03 RX ADMIN — LOSARTAN POTASSIUM 100 MILLIGRAM(S): 100 TABLET, FILM COATED ORAL at 05:47

## 2023-02-03 RX ADMIN — PANTOPRAZOLE SODIUM 40 MILLIGRAM(S): 20 TABLET, DELAYED RELEASE ORAL at 05:46

## 2023-02-03 RX ADMIN — HEPARIN SODIUM 5000 UNIT(S): 5000 INJECTION INTRAVENOUS; SUBCUTANEOUS at 21:19

## 2023-02-03 RX ADMIN — CEFTRIAXONE 100 MILLIGRAM(S): 500 INJECTION, POWDER, FOR SOLUTION INTRAMUSCULAR; INTRAVENOUS at 14:53

## 2023-02-03 RX ADMIN — Medication 25 MILLIGRAM(S): at 05:46

## 2023-02-03 RX ADMIN — SENNA PLUS 2 TABLET(S): 8.6 TABLET ORAL at 21:19

## 2023-02-03 RX ADMIN — BUDESONIDE AND FORMOTEROL FUMARATE DIHYDRATE 2 PUFF(S): 160; 4.5 AEROSOL RESPIRATORY (INHALATION) at 17:50

## 2023-02-03 RX ADMIN — DONEPEZIL HYDROCHLORIDE 5 MILLIGRAM(S): 10 TABLET, FILM COATED ORAL at 21:19

## 2023-02-03 RX ADMIN — ATORVASTATIN CALCIUM 80 MILLIGRAM(S): 80 TABLET, FILM COATED ORAL at 21:19

## 2023-02-03 RX ADMIN — Medication 25 MILLIGRAM(S): at 16:53

## 2023-02-03 RX ADMIN — Medication 81 MILLIGRAM(S): at 12:26

## 2023-02-03 NOTE — PROGRESS NOTE ADULT - ASSESSMENT
77 m with DM, HTN, HLD, asthma , memory impairment presenting for shortness or breath and generalized weakness for the past few weeks, after a recent flu about 4 weeks ago  here febrile to 100.8, WBC: 11  blood cx: strep, repeat negative 1/30  urine cx negative  chest/abd CTA no source of infection  TTE: no reported vegetation and HAIM read as A small, calcified, mobile mass is attached to the aortic valve leaflets on the aortic side of the valve, and is mostconsistent with a Lambl's excresence. It is not consistent with endocarditis    fever, slight leukocytosis, strep mutans bacteremia, chest/abd CTA negative, TTE no veg, likely dental source, stated that he had a dental cleaning 3 months ago but denied any dental issues  repeat blood cx 1/30  also positive and on 1/31 negative  HAIM read as A small, calcified, mobile on aortic valve leaflets on the aortic side of the valve, and is most consistent with a Lambl's excresence,  not consistent with endocarditis    * no source of infection on dental exam but plan for xray today  * c/w ceftriaxone 2 qd  * HAIM now with a mobile calcified mass on aortic valve which was read as Lambl's excresence, not consistent with endocarditis but with high grade strep mutans bacteremia with no clear source, repeat was also positive, will treat as endocarditis with 4 weeks of IV  * can place a picc line for a 4 week course of ceftriaxone from the negative blood cx  * weekly CBC, CMP while on antibiotics  * f/u with ID in 4 weeks for surveillance blood cx and repeat echo      The above assessment and plan was discussed with the primary team    Dagmar Steward MD  contact on teams  After 5pm and on weekends call [phone]

## 2023-02-03 NOTE — CONSULT NOTE ADULT - CONSULT REASON
Dental paged to rule out dental source of endocarditis.
bacteremia
abnormal EKG
Shortness of Breath, Elevated trop

## 2023-02-03 NOTE — PROGRESS NOTE ADULT - SUBJECTIVE AND OBJECTIVE BOX
EP Attending  HISTORY OF PRESENT ILLNESS: HPI:  Patient is a 77 year old male w/pmh  of HTN, HLD, DM, asthma , memory impairment presenting for  shortness or breath for the past few weeks.   Patient reports recent flu infection about 4 weeks prior to admission. Since that time , patient  reports feeling fatigue , has a  non productive cough, intermittent nasal congestion,   associated with shortness of breath , increased dyspnea with exertion and decreased exercise tolerance .  He reports no chest pain , but does have palpitations , he has no lower extremity edema ,  he reports no fever or chills, no dysuria , no abdominal pain , no nausea or vomiting and no diarrhea.  of note , patient recently returned from a trip to [place]. His son was recently ill with a URI . He also reports increased social stressors during this time.    (29 Jan 2023 20:31)    Mr Ribeiro is a very pleasant 77yoM with self-described anxiety (not treated), and here with shortness of breath. 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  He has been experiencing palpitations for the last few weeks, prior to which no such symptoms.  Has been intermittently lightheaded at home - unrelated to timing of abnormal heartbeats.  No fainting. No orthopnea or leg edema.  Rapid response teams have been called to patient bedside for salvos of fast heartbeats seen on telemetry, which he has associated with "mild palpitations and sense of racing/urgency in his chest".  He's been treated with metoprolol IV.    A 10 pt ROS is otherwise negative.    Date of service 2/3- resting in recliner.  feels badly- fatigued, no energy, poor appetite, but still wants to go home.  awaiting PICC line and home antibiotic setup. would rather recover and rest in his own home.    PAST MEDICAL & SURGICAL HISTORY:  HTN (hypertension)  HLD (hyperlipidemia)  Diabetes  Asthma  ADHD  S/P appendectomy  H/O hernia repair    acetaminophen     Tablet .. 650 milliGRAM(s) Oral every 6 hours PRN  ALPRAZolam 0.25 milliGRAM(s) Oral once  aluminum hydroxide/magnesium hydroxide/simethicone Suspension 30 milliLiter(s) Oral every 4 hours PRN  aspirin enteric coated 81 milliGRAM(s) Oral daily  atorvastatin 80 milliGRAM(s) Oral at bedtime  budesonide 160 MICROgram(s)/formoterol 4.5 MICROgram(s) Inhaler 2 Puff(s) Inhalation two times a day  cefTRIAXone   IVPB 2000 milliGRAM(s) IV Intermittent every 24 hours  dextroamphetamine ER capsule 10 milliGRAM(s) 10 milliGRAM(s) Oral daily  dextrose 5%. 1000 milliLiter(s) IV Continuous <Continuous>  dextrose 5%. 1000 milliLiter(s) IV Continuous <Continuous>  dextrose 50% Injectable 25 Gram(s) IV Push once  dextrose 50% Injectable 12.5 Gram(s) IV Push once  dextrose Oral Gel 15 Gram(s) Oral once PRN  donepezil 5 milliGRAM(s) Oral at bedtime  Gemtesa 75mg Tablet 1 Tablet(s) 1 Tablet(s) Oral daily  glucagon  Injectable 1 milliGRAM(s) IntraMuscular once  heparin   Injectable 5000 Unit(s) SubCutaneous every 8 hours  insulin glargine Injectable (LANTUS) 16 Unit(s) SubCutaneous at bedtime  insulin lispro (ADMELOG) corrective regimen sliding scale   SubCutaneous three times a day before meals  insulin lispro (ADMELOG) corrective regimen sliding scale   SubCutaneous at bedtime  losartan 100 milliGRAM(s) Oral daily  melatonin 3 milliGRAM(s) Oral at bedtime PRN  metoprolol tartrate 25 milliGRAM(s) Oral every 12 hours  montelukast 10 milliGRAM(s) Oral daily  ondansetron Injectable 4 milliGRAM(s) IV Push every 8 hours PRN  pantoprazole    Tablet 40 milliGRAM(s) Oral before breakfast  polyethylene glycol 3350 17 Gram(s) Oral daily PRN  senna 2 Tablet(s) Oral at bedtime  tadalafil 5 milliGRAM(s) Oral daily                        10.9   9.44  )-----------( 428      ( 03 Feb 2023 07:08 )             34.2       02-03    137  |  101  |  11  ----------------------------<  106<H>  4.1   |  23  |  0.56    Ca    9.8      03 Feb 2023 07:08  Phos  3.5     02-01  Mg     1.8     02-01    TPro  7.6  /  Alb  3.3  /  TBili  0.4  /  DBili  x   /  AST  14  /  ALT  10  /  AlkPhos  50  02-01      T(C): 36.4 (02-03-23 @ 04:29), Max: 36.6 (02-02-23 @ 17:23)  HR: 70 (02-03-23 @ 04:29) (66 - 76)  BP: 133/75 (02-03-23 @ 04:29) (133/75 - 148/80)  RR: 18 (02-03-23 @ 04:29) (18 - 18)  SpO2: 97% (02-03-23 @ 04:29) (95% - 97%)  Wt(kg): --    I&O's Summary    Appearance: well appearing adult male in no acute distress, nondiaphoretic	  HEENT:   Normal oral mucosa, PERRL, EOMI	  Lymphatic: No lymphadenopathy , no edema  Cardiovascular: Normal S1 S2, No JVD, No murmurs , Peripheral pulses palpable 2+ bilaterally  Respiratory: Lungs clear to auscultation, normal effort 	  Gastrointestinal:  Soft, Non-tender, + BS	  Skin: No rashes, No ecchymoses, No cyanosis, warm to touch  Musculoskeletal: Normal range of motion, normal strength  Psychiatry:  Mood is "I'm nervous and anxious",  affect appropriate    TELEMETRY: NSR, RBBB, episodes of PAT with same QRS complex.  Episodes terminate spontaneously, but he demonstrates a few episodes back-to-back.	  On 2/3, no arrhythmia.  ECG: NSR, bursts of PAT, RBBB QRS.  Echo:  < from: Transthoracic Echocardiogram (01.31.23 @ 08:40) >  Dimensions:    Normal Values:  LA:     3.6    2.0 - 4.0 cm  Ao:     3.3    2.0 - 3.8 cm  SEPTUM: 1.2    0.6 - 1.2 cm  PWT:    1.1    0.6 - 1.1 cm  LVIDd:  4.0    3.0 - 5.6 cm  LVIDs:  2.5    1.8 - 4.0 cm  Derived variables:  LVMI: 67 g/m2  RWT: 0.55  Fractional short: 38 %  EF (Almaraz Rule): 53 %Doppler Peak Velocity (m/sec):  AoV=3.1  ------------------------------------------------------------------------  Observations:  Mitral Valve: Mitral annular calcification, otherwise  normal mitral valve. Mild mitral regurgitation.  Aortic Valve/Aorta: Calcified trileaflet aortic valve with  decreased opening. Peak transaortic valve gradient equals  38 mm Hg, mean transaortic valve gradient equals 27 mm Hg,  estimated aortic valve area equals 1.3 sqcm (by continuity  equation), aortic valve velocity time integral equals 73  cm, consistent with moderate aortic stenosis. Mild aortic  regurgitation.  Peak left ventricular outflow tract  gradient equals 5 mm Hg, mean gradient is equal to 2 mm Hg,  LVOT velocity time integral equals 22 cm.  Aortic Root: 3.3 cm.  LVOT diameter: 2.3 cm.  Left Atrium: Normal left atrium.  LA volume index = 29  cc/m2.  Left Ventricle: Normal left ventricular systolic function.  No segmental wall motion abnormalities. Increased relative  wall thickness with normal left ventricular mass index,  consistent with concentric left ventricular remodeling.  Mild diastolic dysfunction (Stage I).  Right Heart: Normal right atrium. Normal right ventricular  size and function. Normal tricuspid valve. Minimal  tricuspid regurgitation. Normal pulmonic valve. Minimal  pulmonic regurgitation.  Pericardium/Pleura: Normal pericardium with no pericardial  effusion.  Hemodynamic: Estimated right atrial pressure is 8 mm Hg.  Inadequate tricuspid regurgitation Doppler envelope  precludes estimation of RVSP.    < end of copied text >    < from: Transesophageal Echocardiogram w/o TTE (02.01.23 @ 14:31) >  Observations:  Mitral Valve: Normal mitral valve. Minimal mitral  regurgitation.  Aortic Valve/Aorta: Moderate aortic stenosis (see recent  transthoracic echocardiogram).  Mild-moderate aortic regurgitation.  A small, calcified, mobile mass is attached to the aortic  valve leaflets on the aortic side of the valve, and is most  consistent with a Lambl's excresence.  Normal size aortic root, arch, and descending thoracic  aorta.  Mild atherosclerosis.  Left Atrium: Normal left atrium.  Left Ventricle: Normal left ventricular internal  dimensions. Concentric hypertrophy.  Normal left ventricular systolic function. No segmental  wall motion abnormalities.  Right Heart: Normal right atrium. Normal right ventricular  size and function.  Normal tricuspid valve.  Normal pulmonic valve. Minimal pulmonic regurgitation.  Pericardium/Pleura: Normal pericardium with no pericardial  effusion.  Hemodynamic: No evidence of pulmonary hypertension.    < end of copied text >    ASSESSMENT/PLAN: 	Mr Ribeiro is a pleasant 77y Male here for workup of infection. Found to have salvos of PAT on telemetry. He has a RBBB QRS on EKG.  At present, he feels anxious and stressed about his entire lifestyle, but has felt ill for the last week or two, and has been experiencing palpitations during the last 2-4 weeks.  Some unrelated episodes of lightheadedness, but no fainting. Echocardiogram reassuring- LVH without systolic dysfunction, Moderate AS but no endocarditis.  His telemetry reveals episodes of paroxysmal atrial tachycardia for 3-10sec at a time, sometimes with multiple salvos back to back.  These episodes are associated with his palpitations.  No sustained atrial fibrillation at this point. He has HTN, diabetes, but no known prior strokes.    For lack of evidence that ~10-20sec of irregular heartbeats can result in a stroke, he does not have "AFib" yet, and does not yet require oral anticoagulation.  At this time I would not recommend antiarrhythmic drugs.  Maintain telemetry, and maintain K4-4.5 and Mg 2 while hospitalized.  As an outpatient he should undergo cardiac rhythm monitoring (event monitors).  If he has sustained AFib (many minutes in a row), he should be anticoagulated then for stroke prevention.    His hemodynamics will be improved at lower heartrates in the setting of LVH. OK to titrate Metoprolol dose to a resting HR in the 60s.  Awaiting PICC line and home antibiotics.    Will follow with you.  Sees Dr Deleon.    Duc Martínez M.D.  Cardiac Electrophysiology    office [phone]  pager [phone]

## 2023-02-03 NOTE — PROGRESS NOTE ADULT - SUBJECTIVE AND OBJECTIVE BOX
DATE OF SERVICE: 02-03-23 @ 11:54    Patient is a 77y old  Male who presents with a chief complaint of sob x few weeks (03 Feb 2023 10:00)      INTERVAL HISTORY: feels ok    	  MEDICATIONS:  losartan 100 milliGRAM(s) Oral daily  metoprolol tartrate 25 milliGRAM(s) Oral every 12 hours  tadalafil 5 milliGRAM(s) Oral daily        PHYSICAL EXAM:  T(C): 36.5 (02-03-23 @ 11:13), Max: 36.6 (02-02-23 @ 17:23)  HR: 73 (02-03-23 @ 11:13) (66 - 76)  BP: 132/79 (02-03-23 @ 11:13) (132/79 - 148/80)  RR: 18 (02-03-23 @ 11:13) (18 - 18)  SpO2: 99% (02-03-23 @ 11:13) (95% - 99%)  Wt(kg): --  I&O's Summary    03 Feb 2023 07:01  -  03 Feb 2023 11:54  --------------------------------------------------------  IN: 240 mL / OUT: 0 mL / NET: 240 mL          Appearance: In no distress	  HEENT:    PERRL, EOMI	  Cardiovascular:  S1 S2, No JVD  Respiratory: Lungs clear to auscultation	  Gastrointestinal:  Soft, Non-tender, + BS	  Vascularature:  No edema of LE  Psychiatric: Appropriate affect   Neuro: no acute focal deficits                               10.9   9.44  )-----------( 428      ( 03 Feb 2023 07:08 )             34.2     02-03    137  |  101  |  11  ----------------------------<  106<H>  4.1   |  23  |  0.56    Ca    9.8      03 Feb 2023 07:08  Phos  3.5     02-01  Mg     1.8     02-01    TPro  7.6  /  Alb  3.3  /  TBili  0.4  /  DBili  x   /  AST  14  /  ALT  10  /  AlkPhos  50  02-01        Labs personally reviewed      from: Transthoracic Echocardiogram (01.31.23 @ 08:40) >  Conclusions:  1. Calcified trileaflet aortic valve with decreased  opening. Peak transaortic valve gradient equals 38 mm Hg,  mean transaortic valve gradient equals 27 mm Hg, estimated  aortic valve area equals 1.3 sqcm (by continuity equation),  aortic valve velocity time integral equals 73 cm,  consistent with moderate aortic stenosis.  2. Increased relative wall thickness with normal left  ventricular mass index, consistent with concentric left  ventricular remodeling.  3. Normal left ventricular systolic function. No segmental  wall motion abnormalities.  4. Mild diastolic dysfunction (Stage I).  5. Normal right ventricular size and function.  *** No previous Echo exam.    < end of copied text >      ASSESSMENT/PLAN: 	    Patient is a 77 year old male w/pmh  of HTN, HLD, DM, asthma , memory impairment presenting for  shortness or breath for the past few weeks. He reports no chest pain, LE edema, fever, chills, N/V/D but does have palpitations.  Followed by OP Cardiologist, Dr. Siu.      Problem/Plan -1  Problem: Shortness of Breath  - ECG NSR, RBBB (no previous ECG available to compare)  - Trop nondiagnostic 57 --> 65  - CRP elevated  - CT neg for PE (heart size normal, no pericardial effusion)  - CXR with clear lungs  - pro BNP 93  - TTE shows EF53%, moderate AS, concentric LV remodeling, no WMA, mild DD, normal RV  - c/w ASA 81mg PO and Atorvastatin 80mg PO daily  - given very elevated ESR, FUO and SOB will consider r/o endocarditis   - Follow up repeat Blood Cx. HAIM no vegetation  - given typical organism, will treat for endocarditis per ID, need PICC for long term abx    Problem/Plan -2  Problem:  Hypertension  - c/w Losartan 100mg PO daily    Problem/Plan -3  Problem: Hyperlipidemia  - LDL 69  - c/w Atorvastatin 80mg PO daily    Problem/Plan -4  Problem: PAT  - Start Metoprolol 25mg BID              Osmani Candelaria DO [facility]  Cardiovascular Medicine  [address], [address]  Office: [phone]  Available via Text/call on Microsoft Teams

## 2023-02-03 NOTE — CONSULT NOTE ADULT - SUBJECTIVE AND OBJECTIVE BOX
Oral Ribeiro ([id number]): 77y old male admitted for sob x few weeks (01 Feb 2023 12:27)  Dental paged to rule out dental source of endocarditis.      HPI:  Patient is a 77 year old male w/pmh  of HTN, HLD, DM, asthma , memory impairment presenting for  shortness or breath for the past few weeks.   Patient reports recent flu infection about 4 weeks prior to admission. Since that time , patient  reports feeling fatigue , has a  non productive cough, intermittent nasal congestion,   associated with shortness of breath , increased dyspnea with exertion and decreased exercise tolerance .  He reports no chest pain , but does have palpitations , he has no lower extremity edema ,  he reports no fever or chills, no dysuria , no abdominal pain , no nausea or vomiting and no diarrhea.  of note , patient recently returned from a trip to [place]. His son was recently ill with a URI . He also reports increased social stressors during this time.    (29 Jan 2023 20:31)      PAST MEDICAL & SURGICAL HISTORY:  HTN (hypertension)      HLD (hyperlipidemia)      Diabetes      Asthma      ADHD      S/P appendectomy      H/O hernia repair        (   ) heart valve replacement  (   ) joint replacement  (   ) pregnancy    MEDICATIONS  (STANDING):  ALPRAZolam 0.25 milliGRAM(s) Oral once  aspirin enteric coated 81 milliGRAM(s) Oral daily  atorvastatin 80 milliGRAM(s) Oral at bedtime  budesonide 160 MICROgram(s)/formoterol 4.5 MICROgram(s) Inhaler 2 Puff(s) Inhalation two times a day  cefTRIAXone   IVPB 2000 milliGRAM(s) IV Intermittent every 24 hours  dextroamphetamine ER capsule 10 milliGRAM(s) 10 milliGRAM(s) Oral daily  dextrose 5%. 1000 milliLiter(s) (100 mL/Hr) IV Continuous <Continuous>  dextrose 5%. 1000 milliLiter(s) (50 mL/Hr) IV Continuous <Continuous>  dextrose 50% Injectable 25 Gram(s) IV Push once  dextrose 50% Injectable 12.5 Gram(s) IV Push once  donepezil 5 milliGRAM(s) Oral at bedtime  Gemtesa 75mg Tablet 1 Tablet(s) 1 Tablet(s) Oral daily  glucagon  Injectable 1 milliGRAM(s) IntraMuscular once  heparin   Injectable 5000 Unit(s) SubCutaneous every 8 hours  insulin glargine Injectable (LANTUS) 16 Unit(s) SubCutaneous at bedtime  insulin lispro (ADMELOG) corrective regimen sliding scale   SubCutaneous three times a day before meals  insulin lispro (ADMELOG) corrective regimen sliding scale   SubCutaneous at bedtime  losartan 100 milliGRAM(s) Oral daily  metoprolol tartrate 25 milliGRAM(s) Oral every 12 hours  montelukast 10 milliGRAM(s) Oral daily  pantoprazole    Tablet 40 milliGRAM(s) Oral before breakfast  senna 2 Tablet(s) Oral at bedtime  tadalafil 5 milliGRAM(s) Oral daily    MEDICATIONS  (PRN):  acetaminophen     Tablet .. 650 milliGRAM(s) Oral every 6 hours PRN Temp greater or equal to 38C (100.4F), Mild Pain (1 - 3)  aluminum hydroxide/magnesium hydroxide/simethicone Suspension 30 milliLiter(s) Oral every 4 hours PRN Dyspepsia  dextrose Oral Gel 15 Gram(s) Oral once PRN Blood Glucose LESS THAN 70 milliGRAM(s)/deciliter  melatonin 3 milliGRAM(s) Oral at bedtime PRN Insomnia  ondansetron Injectable 4 milliGRAM(s) IV Push every 8 hours PRN Nausea and/or Vomiting  polyethylene glycol 3350 17 Gram(s) Oral daily PRN Constipation      Allergies    No Known Allergies    Intolerances        FAMILY HISTORY:  FH: heart disease (Father)        *SOCIAL HISTORY: (guardian or who pt came with), (smoking hx)    *Last Dental Visit:    Vital Signs Last 24 Hrs  T(C): 36.5 (03 Feb 2023 11:13), Max: 36.6 (02 Feb 2023 17:23)  T(F): 97.7 (03 Feb 2023 11:13), Max: 97.8 (02 Feb 2023 17:23)  HR: 73 (03 Feb 2023 11:13) (66 - 76)  BP: 132/79 (03 Feb 2023 11:13) (132/79 - 147/78)  BP(mean): --  RR: 18 (03 Feb 2023 11:13) (18 - 18)  SpO2: 99% (03 Feb 2023 11:13) (97% - 99%)    Parameters below as of 03 Feb 2023 11:13  Patient On (Oxygen Delivery Method): room air      Patient reports having a cleaning done at the dentist ~ 3 months ago.     EOE: (-) swelling, (-) trismus, (-) lymphadenopathy, (-) asymmetry    IOE:   Soft and hard tissues show no signs of acute infection, no purulence, and no vestibular swelling.  Permanent dentition grossly intact.  Patient reports no intra-oral pain.    Radiographs: Patient refused transport to the dental ED for radiographs on 2/2 and 2/3. Chronic odontogenic infection cannot be ruled out without radiographs; however, no signs of acute infection were observed clinically.    LABS:                        10.9   9.44  )-----------( 428      ( 03 Feb 2023 07:08 )             34.2     02-03    137  |  101  |  11  ----------------------------<  106<H>  4.1   |  23  |  0.56    Ca    9.8      03 Feb 2023 07:08  Phos  3.5     02-01  Mg     1.8     02-01    TPro  7.6  /  Alb  3.3  /  TBili  0.4  /  DBili  x   /  AST  14  /  ALT  10  /  AlkPhos  50  02-01    WBC Count: 9.44 K/uL [3.80 - 10.50] (02-03 @ 07:08)    Platelet Count - Automated: 428 K/uL *H* [150 - 400] (02-03 @ 07:08)  Platelet Count - Automated: 420 K/uL *H* [150 - 400] (02-02 @ 07:18)  Platelet Count - Automated: 396 K/uL [150 - 400] (02-01 @ 20:22)      Culture Results:   No growth to date. (02-01 @ 10:42)  Culture Results:   No growth to date. (02-01 @ 10:42)      ASSESSMENT: Based on clinical exam, patient shows no signs of acute odontogenic infection. Patient's endocarditis is unlikely of dental origin.    RECOMMENDATIONS:   1) Endocarditis is unlikely of dental origin. Rule out other causes of endocarditis.   2) Follow up with outpatient dentist for comprehensive care.      Cesilia Martin DDS [id number]

## 2023-02-03 NOTE — PROGRESS NOTE ADULT - ASSESSMENT
77 M  w/pmh  of HTN, HLD, DM, asthma , memory impairment presenting for  shortness or breath for the past few weeks.      Elevated troponin  - ? Type II nstemi in setting of increased stress and influenza infection, however high risk for cardiac disease  Vs concern for endocarditis   - Monitor on telemetry   - trend cardiac enzymes   - Cardiology consult appreciated; F/u recs   - continue asa   - statin.    Fever  - Blood cultures +, concern for endocarditis, RVP neg   , UA / UCx neg , CXR clear ,CTA chest/ AP without infectious findings   - F/u BCx2 01/29 w/ gm +, Strep mutans, alpha hemolytic strep  - Repeat BCx2 01/30 Gram + ; Strep mutans   - F/u Repeat Cx 01/31 -- NGTD; F/u final   - F/u Repeat Cx 02/01 -- in lab; F/u results   - TTE with EF of 53%, mild MR, Mod AS, Mild AR, no Seg WMA  - Elevated ESR and CRP - trend   - C/w ABX as per ID, ID eval appreciated -- On Rocephin 2g Qd   - Monitor CBC, temp curve, VS and patient closely   - Check HAIM -- As noted with Mod AS, Mild-Mod AR, Small calcified mobile mass to the aortic valve, consistent with Lambl's excrescene --> F/u cardio recs  - C/w ABX X 4 weeks per ID via PICC line , DIspo planing   - Plan for outpatient ID follow up as an outpatient   - Dental eval called; F/u recs     Afib RVR  - Episode of Afib overnight 02/01- 02/02  - Cardio and EP follow ups appreciated; F/u recs  - On Lopressor 12.5 PO BID for rate control   - CHADSVASC elevated, per EP no AC at this time  - Cont to monitor on tele. Monitor lytes for K>4 and Mg > 2    - CHeck Cardiac MR, Check LE duplex, noted     Dyspnea/ Shortness or breath  - Bnp wnl , chest imaging w/o PE , no lung pathology noted  , symptoms can be 2/2 deconditioning Vs. anginal equivalent    - TTE as noted; F/u HAIM   - Monitor O2 saturation closely   - Episode of PAT 01/31-02/01 overnight. Mag and Phos added to AM labs. F/u cardiology recs    Diabetes mellitus.   - Reduced home dose while inpatient , can uptitrate as tolerates  - Lantus 16 units QHs  and sliding scale  - Hold oral hypoglycemics  - check fsg qac/qhs  - check a1c -- 7.0  - consistent carb diet.    Asthma.   - Symbicort   - montelukast   - Duoneb PRN.    HTN (hypertension).   - continue losartan.    HLD  - Statin   - LDL of 69    ADHD.   - Dextroamphetamine( non formulary , patients own meds).    BPH (benign prostatic hyperplasia).   - Tadalafil ( patients own meds )   - Vibegon ( patients own meds).      PPX  - Heparin 5K Q 8   - PPI       D/C Planing

## 2023-02-03 NOTE — PROGRESS NOTE ADULT - SUBJECTIVE AND OBJECTIVE BOX
Name of Patient : MARY JANE CORONADO  MRN: [id number]  Date of visit: 02-03-23       Subjective: Patient seen and examined. No new events except as noted.   Doing okay   line placement for long term IV antibtiotics     REVIEW OF SYSTEMS:    CONSTITUTIONAL: No weakness, fevers or chills  EYES/ENT: No visual changes;  No vertigo or throat pain   NECK: No pain or stiffness  RESPIRATORY: No cough, wheezing, hemoptysis; No shortness of breath  CARDIOVASCULAR: No chest pain or palpitations  GASTROINTESTINAL: No abdominal or epigastric pain. No nausea, vomiting, or hematemesis; No diarrhea or constipation. No melena or hematochezia.  GENITOURINARY: No dysuria, frequency or hematuria  NEUROLOGICAL: No numbness or weakness  SKIN: No itching, burning, rashes, or lesions   All other review of systems is negative unless indicated above.    MEDICATIONS:  MEDICATIONS  (STANDING):  ALPRAZolam 0.25 milliGRAM(s) Oral once  aspirin enteric coated 81 milliGRAM(s) Oral daily  atorvastatin 80 milliGRAM(s) Oral at bedtime  budesonide 160 MICROgram(s)/formoterol 4.5 MICROgram(s) Inhaler 2 Puff(s) Inhalation two times a day  cefTRIAXone   IVPB 2000 milliGRAM(s) IV Intermittent every 24 hours  dextroamphetamine ER capsule 10 milliGRAM(s) 10 milliGRAM(s) Oral daily  dextrose 5%. 1000 milliLiter(s) (100 mL/Hr) IV Continuous <Continuous>  dextrose 5%. 1000 milliLiter(s) (50 mL/Hr) IV Continuous <Continuous>  dextrose 50% Injectable 25 Gram(s) IV Push once  dextrose 50% Injectable 12.5 Gram(s) IV Push once  donepezil 5 milliGRAM(s) Oral at bedtime  Gemtesa 75mg Tablet 1 Tablet(s) 1 Tablet(s) Oral daily  glucagon  Injectable 1 milliGRAM(s) IntraMuscular once  heparin   Injectable 5000 Unit(s) SubCutaneous every 8 hours  insulin glargine Injectable (LANTUS) 16 Unit(s) SubCutaneous at bedtime  insulin lispro (ADMELOG) corrective regimen sliding scale   SubCutaneous three times a day before meals  insulin lispro (ADMELOG) corrective regimen sliding scale   SubCutaneous at bedtime  losartan 100 milliGRAM(s) Oral daily  metoprolol tartrate 25 milliGRAM(s) Oral every 12 hours  montelukast 10 milliGRAM(s) Oral daily  pantoprazole    Tablet 40 milliGRAM(s) Oral before breakfast  senna 2 Tablet(s) Oral at bedtime  tadalafil 5 milliGRAM(s) Oral daily      PHYSICAL EXAM:  T(C): 36.5 (02-03-23 @ 11:13), Max: 36.5 (02-03-23 @ 11:13)  HR: 73 (02-03-23 @ 11:13) (66 - 73)  BP: 132/79 (02-03-23 @ 11:13) (132/79 - 147/78)  RR: 18 (02-03-23 @ 11:13) (18 - 18)  SpO2: 99% (02-03-23 @ 11:13) (97% - 99%)  Wt(kg): --  I&O's Summary    03 Feb 2023 07:01  -  03 Feb 2023 17:30  --------------------------------------------------------  IN: 480 mL / OUT: 0 mL / NET: 480 mL          Appearance: Normal	  HEENT:  PERRLA   Lymphatic: No lymphadenopathy   Cardiovascular: Normal S1 S2, no JVD  Respiratory: normal effort , clear  Gastrointestinal:  Soft, Non-tender  Skin: No rashes,  warm to touch  Psychiatry:  Mood & affect appropriate  Musculuskeletal: R LE soft cast     recent labs, Imaging and EKGs personally reviewed     02-03-23 @ 07:01  -  02-03-23 @ 17:30  --------------------------------------------------------  IN: 480 mL / OUT: 0 mL / NET: 480 mL                          10.9   9.44  )-----------( 428      ( 03 Feb 2023 07:08 )             34.2               02-03    137  |  101  |  11  ----------------------------<  106<H>  4.1   |  23  |  0.56    Ca    9.8      03 Feb 2023 07:08  Phos  3.5     02-01  Mg     1.8     02-01    TPro  7.6  /  Alb  3.3  /  TBili  0.4  /  DBili  x   /  AST  14  /  ALT  10  /  AlkPhos  50  02-01

## 2023-02-03 NOTE — PROGRESS NOTE ADULT - SUBJECTIVE AND OBJECTIVE BOX
Follow Up:  strep bacteremia    Interval History/ROS: pt is doing well, no fever, cough or abd pain          Allergies  No Known Allergies        ANTIMICROBIALS:  cefTRIAXone   IVPB 2000 every 24 hours      OTHER MEDS:  acetaminophen     Tablet .. 650 milliGRAM(s) Oral every 6 hours PRN  ALPRAZolam 0.25 milliGRAM(s) Oral once  aluminum hydroxide/magnesium hydroxide/simethicone Suspension 30 milliLiter(s) Oral every 4 hours PRN  aspirin enteric coated 81 milliGRAM(s) Oral daily  atorvastatin 80 milliGRAM(s) Oral at bedtime  budesonide 160 MICROgram(s)/formoterol 4.5 MICROgram(s) Inhaler 2 Puff(s) Inhalation two times a day  dextroamphetamine ER capsule 10 milliGRAM(s) 10 milliGRAM(s) Oral daily  dextrose 5%. 1000 milliLiter(s) IV Continuous <Continuous>  dextrose 5%. 1000 milliLiter(s) IV Continuous <Continuous>  dextrose 50% Injectable 25 Gram(s) IV Push once  dextrose 50% Injectable 12.5 Gram(s) IV Push once  dextrose Oral Gel 15 Gram(s) Oral once PRN  donepezil 5 milliGRAM(s) Oral at bedtime  Gemtesa 75mg Tablet 1 Tablet(s) 1 Tablet(s) Oral daily  glucagon  Injectable 1 milliGRAM(s) IntraMuscular once  heparin   Injectable 5000 Unit(s) SubCutaneous every 8 hours  insulin glargine Injectable (LANTUS) 16 Unit(s) SubCutaneous at bedtime  insulin lispro (ADMELOG) corrective regimen sliding scale   SubCutaneous three times a day before meals  insulin lispro (ADMELOG) corrective regimen sliding scale   SubCutaneous at bedtime  losartan 100 milliGRAM(s) Oral daily  melatonin 3 milliGRAM(s) Oral at bedtime PRN  metoprolol tartrate 25 milliGRAM(s) Oral every 12 hours  montelukast 10 milliGRAM(s) Oral daily  ondansetron Injectable 4 milliGRAM(s) IV Push every 8 hours PRN  pantoprazole    Tablet 40 milliGRAM(s) Oral before breakfast  polyethylene glycol 3350 17 Gram(s) Oral daily PRN  senna 2 Tablet(s) Oral at bedtime  tadalafil 5 milliGRAM(s) Oral daily      Vital Signs Last 24 Hrs  T(C): 36.5 (03 Feb 2023 11:13), Max: 36.6 (02 Feb 2023 17:23)  T(F): 97.7 (03 Feb 2023 11:13), Max: 97.8 (02 Feb 2023 17:23)  HR: 73 (03 Feb 2023 11:13) (66 - 76)  BP: 132/79 (03 Feb 2023 11:13) (132/79 - 147/78)  BP(mean): --  RR: 18 (03 Feb 2023 11:13) (18 - 18)  SpO2: 99% (03 Feb 2023 11:13) (97% - 99%)    Parameters below as of 03 Feb 2023 11:13  Patient On (Oxygen Delivery Method): room air        Physical Exam:  General:    NAD,  non toxic, sitting on a chair  Respiratory:    comfortable on RA  abd:     soft,     no tenderness  :   no CVAT,  no suprapubic tenderness,   no  hartley  Musculoskeletal:   no joint swelling  vascular: no phlebitis  Skin:    no rash                          10.9   9.44  )-----------( 428      ( 03 Feb 2023 07:08 )             34.2       02-03    137  |  101  |  11  ----------------------------<  106<H>  4.1   |  23  |  0.56    Ca    9.8      03 Feb 2023 07:08  Phos  3.5     02-01  Mg     1.8     02-01    TPro  7.6  /  Alb  3.3  /  TBili  0.4  /  DBili  x   /  AST  14  /  ALT  10  /  AlkPhos  50  02-01          MICROBIOLOGY:  v  .Blood Blood  02-01-23   No growth to date.  --  --      .Blood Blood-Peripheral  01-31-23   Growth in aerobic bottle: Gram Positive Cocci in Pairs and Chains  --    Growth in aerobic bottle: Gram Positive Cocci in Pairs and Chains      .Blood Blood-Peripheral  01-31-23   Growth in aerobic bottle: Gram positive cocci in pairs  Growth in anaerobic bottle: Gram positive cocci in pairs  --    Growth in aerobic bottle: Gram positive cocci in pairs  Growth in anaerobic bottle: Gram positive cocci in pairs      .Blood Blood-Peripheral  01-30-23   Growth in aerobic and anaerobic bottles: Streptococcus mutans  See previous culture [id number]  --    Growth in anaerobic bottle: Gram positive cocci in pairs  Growth in aerobic bottle: Gram positive cocci in pairs      .Blood Blood-Peripheral  01-30-23   Growth in aerobic and anaerobic bottles: Streptococcus mutans  --  Streptococcus mutans      Clean Catch Clean Catch (Midstream)  01-29-23   <10,000 CFU/mL Normal Urogenital Tiana  --  --      .Blood Blood-Peripheral  01-29-23   Growth in aerobic and anaerobic bottles: Streptococcus mutans  ***Blood Panel PCR results on this specimen are available  approximately 3 hours after the Gram stain result.***  Gram stain, PCR, and/or culture results may not always  correspond due to difference in methodologies.  ************************************************************  This PCR assay was performed by multiplex PCR. This  Assay tests for 66 bacterial and resistance gene targets.  Please refer to the [facility] test directory  at [email or web address] for details.  --  Blood Culture PCR      .Blood Blood-Peripheral  01-29-23   Growth in aerobic and anaerobic bottles: Streptococcus mutans  See previous culture [id number]  --    Growth in anaerobic bottle: Gram Positive Cocci in Pairs and Chains  Growth in aerobic bottle: Gram Positive Cocci in Pairs and Chains          Rapid RVP Result: NotDetec (01-29 @ 11:48)        RADIOLOGY:  Images independently visualized and reviewed personally, findings as below  < from: [facility] Duplex Lower Ext Vein Scan, Bilat (02.03.23 @ 12:13) >    IMPRESSION:  No evidence of deep venous thrombosis in either lower extremity.    < end of copied text >  < from: CT Abdomen and Pelvis w/ IV Cont (01.29.23 @ 16:31) >  No pulmonary emboli visualized. No source of infection seen.    No acute intrathoracic or abdominal pathology visualized.        < end of copied text >  < from: Transesophageal Echocardiogram w/o TTE (02.01.23 @ 14:31) >  Conclusions:  Normal left ventricular systolic function. No segmental  wall motion abnormalities.  Moderate aortic stenosis (known from recent transthoracic  echo); Mild-moderate aortic regurgitation.  No vegetations seen.  A small, calcified, mobile mass is attached to the aortic  valve leaflets on the aortic side of the valve, and is most  consistent with a Lambl's excresence. It is not consistent  with endocarditis.    < end of copied text >

## 2023-02-04 RX ADMIN — MONTELUKAST 10 MILLIGRAM(S): 4 TABLET, CHEWABLE ORAL at 14:29

## 2023-02-04 RX ADMIN — CEFTRIAXONE 100 MILLIGRAM(S): 500 INJECTION, POWDER, FOR SOLUTION INTRAMUSCULAR; INTRAVENOUS at 14:29

## 2023-02-04 RX ADMIN — Medication 10 MILLIGRAM(S): at 10:24

## 2023-02-04 RX ADMIN — Medication 25 MILLIGRAM(S): at 17:44

## 2023-02-04 RX ADMIN — CHLORHEXIDINE GLUCONATE 1 APPLICATION(S): 213 SOLUTION TOPICAL at 12:27

## 2023-02-04 RX ADMIN — LOSARTAN POTASSIUM 100 MILLIGRAM(S): 100 TABLET, FILM COATED ORAL at 05:20

## 2023-02-04 RX ADMIN — ONDANSETRON 4 MILLIGRAM(S): 8 TABLET, FILM COATED ORAL at 14:42

## 2023-02-04 RX ADMIN — SENNA PLUS 2 TABLET(S): 8.6 TABLET ORAL at 21:50

## 2023-02-04 RX ADMIN — HEPARIN SODIUM 5000 UNIT(S): 5000 INJECTION INTRAVENOUS; SUBCUTANEOUS at 14:29

## 2023-02-04 RX ADMIN — Medication 3 MILLIGRAM(S): at 22:09

## 2023-02-04 RX ADMIN — POLYETHYLENE GLYCOL 3350 17 GRAM(S): 17 POWDER, FOR SOLUTION ORAL at 04:09

## 2023-02-04 RX ADMIN — HEPARIN SODIUM 5000 UNIT(S): 5000 INJECTION INTRAVENOUS; SUBCUTANEOUS at 21:50

## 2023-02-04 RX ADMIN — BUDESONIDE AND FORMOTEROL FUMARATE DIHYDRATE 2 PUFF(S): 160; 4.5 AEROSOL RESPIRATORY (INHALATION) at 17:45

## 2023-02-04 RX ADMIN — Medication 25 MILLIGRAM(S): at 05:20

## 2023-02-04 RX ADMIN — ATORVASTATIN CALCIUM 80 MILLIGRAM(S): 80 TABLET, FILM COATED ORAL at 21:50

## 2023-02-04 RX ADMIN — Medication 81 MILLIGRAM(S): at 14:28

## 2023-02-04 RX ADMIN — PANTOPRAZOLE SODIUM 40 MILLIGRAM(S): 20 TABLET, DELAYED RELEASE ORAL at 05:21

## 2023-02-04 RX ADMIN — INSULIN GLARGINE 16 UNIT(S): 100 INJECTION, SOLUTION SUBCUTANEOUS at 22:14

## 2023-02-04 RX ADMIN — DONEPEZIL HYDROCHLORIDE 5 MILLIGRAM(S): 10 TABLET, FILM COATED ORAL at 21:50

## 2023-02-04 NOTE — PROGRESS NOTE ADULT - SUBJECTIVE AND OBJECTIVE BOX
DATE OF SERVICE: 02-04-23 @ 10:44    Patient is a 77y old  Male who presents with a chief complaint of sob x few weeks (04 Feb 2023 09:54)      INTERVAL HISTORY: Feels ok.     REVIEW OF SYSTEMS:  CONSTITUTIONAL: No weakness  EYES/ENT: No visual changes;  No throat pain   NECK: No pain or stiffness  RESPIRATORY: No cough, wheezing; No shortness of breath  CARDIOVASCULAR: No chest pain or palpitations  GASTROINTESTINAL: No abdominal  pain. No nausea, vomiting, or hematemesis  GENITOURINARY: No dysuria, frequency or hematuria  NEUROLOGICAL: No stroke like symptoms  SKIN: No rashes    TELEMETRY Personally reviewed: SR   	  MEDICATIONS:  losartan 100 milliGRAM(s) Oral daily  metoprolol tartrate 25 milliGRAM(s) Oral every 12 hours  tadalafil 5 milliGRAM(s) Oral daily        PHYSICAL EXAM:  T(C): 36.7 (02-04-23 @ 05:21), Max: 36.7 (02-04-23 @ 05:21)  HR: 72 (02-04-23 @ 05:21) (72 - 83)  BP: 166/93 (02-04-23 @ 05:21) (132/79 - 168/72)  RR: 18 (02-04-23 @ 05:21) (18 - 18)  SpO2: 97% (02-04-23 @ 05:21) (97% - 99%)  Wt(kg): --  I&O's Summary    03 Feb 2023 07:01  -  04 Feb 2023 07:00  --------------------------------------------------------  IN: 480 mL / OUT: 0 mL / NET: 480 mL          Appearance: In no distress	  HEENT:    PERRL, EOMI	  Cardiovascular:  S1 S2, No JVD  Respiratory: Lungs clear to auscultation	  Gastrointestinal:  Soft, Non-tender, + BS	  Vascularature:  No edema of LE  Psychiatric: Appropriate affect   Neuro: no acute focal deficits                               10.9   9.44  )-----------( 428      ( 03 Feb 2023 07:08 )             34.2     02-03    137  |  101  |  11  ----------------------------<  106<H>  4.1   |  23  |  0.56    Ca    9.8      03 Feb 2023 07:08          Labs personally reviewed      ASSESSMENT/PLAN: 	      Patient is a 77 year old male w/pmh  of HTN, HLD, DM, asthma , memory impairment presenting for  shortness or breath for the past few weeks. He reports no chest pain, LE edema, fever, chills, N/V/D but does have palpitations.  Followed by OP Cardiologist, Dr. Siu.      Problem/Plan -1  Problem: Shortness of Breath  - ECG NSR, RBBB (no previous ECG available to compare)  - Trop nondiagnostic 57 --> 65  - CRP elevated  - CT neg for PE (heart size normal, no pericardial effusion)  - CXR with clear lungs  - pro BNP 93  - TTE shows EF53%, moderate AS, concentric LV remodeling, no WMA, mild DD, normal RV  - c/w ASA 81mg PO and Atorvastatin 80mg PO daily  - given very elevated ESR, FUO and SOB will consider r/o endocarditis   - Follow up repeat Blood Cx. HAIM no vegetation  - given typical organism, will treat for endocarditis per ID, need PICC for long term abx    Problem/Plan -2  Problem:  Hypertension  - c/w Losartan 100mg PO daily    Problem/Plan -3  Problem: Hyperlipidemia  - LDL 69  - c/w Atorvastatin 80mg PO daily    Problem/Plan -4  Problem: PAT  - c/w Metoprolol 25mg BID        Shantal Manuel, AG-NP   Osmani Candelaria DO [facility]  Cardiovascular Medicine  [address] [place] [address], [address] [facility]  Available through call or text on [employer]  Office: [phone]

## 2023-02-04 NOTE — PROGRESS NOTE ADULT - SUBJECTIVE AND OBJECTIVE BOX
EP     HISTORY OF PRESENT ILLNESS: HPI:  Patient is a 77 year old male w/pmh  of HTN, HLD, DM, asthma , memory impairment presenting for  shortness or breath for the past few weeks.   Patient reports recent flu infection about 4 weeks prior to admission. Since that time , patient  reports feeling fatigue , has a  non productive cough, intermittent nasal congestion,   associated with shortness of breath , increased dyspnea with exertion and decreased exercise tolerance .  He reports no chest pain , but does have palpitations , he has no lower extremity edema ,  he reports no fever or chills, no dysuria , no abdominal pain , no nausea or vomiting and no diarrhea.  of note , patient recently returned from a trip to [place]. His son was recently ill with a URI . He also reports increased social stressors during this time.    (29 Jan 2023 20:31)    Mr Ribeiro is a very pleasant 77yoM with self-described anxiety (not treated), and here with shortness of breath. 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  He has been experiencing palpitations for the last few weeks, prior to which no such symptoms.  Has been intermittently lightheaded at home - unrelated to timing of abnormal heartbeats.  No fainting. No orthopnea or leg edema.  Rapid response teams have been called to patient bedside for salvos of fast heartbeats seen on telemetry, which he has associated with "mild palpitations and sense of racing/urgency in his chest".  He's been treated with metoprolol IV.    A 10 pt ROS is otherwise negative.    2/3- resting in recliner.  feels badly- fatigued, no energy, poor appetite, but still wants to go home.  awaiting PICC line and home antibiotic setup. would rather recover and rest in his own home.  Date of service   2/4  resting in bed, offers no complaints       PAST MEDICAL & SURGICAL HISTORY:  HTN (hypertension)  HLD (hyperlipidemia)  Diabetes  Asthma  ADHD  S/P appendectomy  H/O hernia repair        acetaminophen     Tablet .. 650 milliGRAM(s) Oral every 6 hours PRN  ALPRAZolam 0.25 milliGRAM(s) Oral once  aluminum hydroxide/magnesium hydroxide/simethicone Suspension 30 milliLiter(s) Oral every 4 hours PRN  aspirin enteric coated 81 milliGRAM(s) Oral daily  atorvastatin 80 milliGRAM(s) Oral at bedtime  bisacodyl Suppository 10 milliGRAM(s) Rectal once  budesonide 160 MICROgram(s)/formoterol 4.5 MICROgram(s) Inhaler 2 Puff(s) Inhalation two times a day  cefTRIAXone   IVPB 2000 milliGRAM(s) IV Intermittent every 24 hours  chlorhexidine 4% Liquid 1 Application(s) Topical <User Schedule>  dextroamphetamine ER capsule 10 milliGRAM(s) 10 milliGRAM(s) Oral daily  dextrose 5%. 1000 milliLiter(s) IV Continuous <Continuous>  dextrose 5%. 1000 milliLiter(s) IV Continuous <Continuous>  dextrose 50% Injectable 25 Gram(s) IV Push once  dextrose 50% Injectable 12.5 Gram(s) IV Push once  dextrose Oral Gel 15 Gram(s) Oral once PRN  donepezil 5 milliGRAM(s) Oral at bedtime  Gemtesa 75mg Tablet 1 Tablet(s) 1 Tablet(s) Oral daily  glucagon  Injectable 1 milliGRAM(s) IntraMuscular once  heparin   Injectable 5000 Unit(s) SubCutaneous every 8 hours  insulin glargine Injectable (LANTUS) 16 Unit(s) SubCutaneous at bedtime  insulin lispro (ADMELOG) corrective regimen sliding scale   SubCutaneous three times a day before meals  insulin lispro (ADMELOG) corrective regimen sliding scale   SubCutaneous at bedtime  losartan 100 milliGRAM(s) Oral daily  melatonin 3 milliGRAM(s) Oral at bedtime PRN  metoprolol tartrate 25 milliGRAM(s) Oral every 12 hours  montelukast 10 milliGRAM(s) Oral daily  ondansetron Injectable 4 milliGRAM(s) IV Push every 8 hours PRN  pantoprazole    Tablet 40 milliGRAM(s) Oral before breakfast  polyethylene glycol 3350 17 Gram(s) Oral daily PRN  senna 2 Tablet(s) Oral at bedtime  sodium chloride 0.9% lock flush 10 milliLiter(s) IV Push every 1 hour PRN  tadalafil 5 milliGRAM(s) Oral daily                            10.9   9.44  )-----------( 428      ( 03 Feb 2023 07:08 )             34.2       Hemoglobin: 10.9 g/dL (02-03 @ 07:08)  Hemoglobin: 11.3 g/dL (02-02 @ 07:18)  Hemoglobin: 11.4 g/dL (02-01 @ 20:22)  Hemoglobin: 11.5 g/dL (02-01 @ 06:54)  Hemoglobin: 11.4 g/dL (01-31 @ 07:03)      02-03    137  |  101  |  11  ----------------------------<  106<H>  4.1   |  23  |  0.56    Ca    9.8      03 Feb 2023 07:08      Creatinine Trend: 0.56<--, 0.61<--, 0.70<--, 0.68<--, 0.54<--, 0.62<--    COAGS:           T(C): 36.7 (02-04-23 @ 05:21), Max: 36.7 (02-04-23 @ 05:21)  HR: 72 (02-04-23 @ 05:21) (72 - 83)  BP: 166/93 (02-04-23 @ 05:21) (132/79 - 168/72)  RR: 18 (02-04-23 @ 05:21) (18 - 18)  SpO2: 97% (02-04-23 @ 05:21) (97% - 99%)  Wt(kg): --    I&O's Summary    03 Feb 2023 07:01  -  04 Feb 2023 07:00  --------------------------------------------------------  IN: 480 mL / OUT: 0 mL / NET: 480 mL      Appearance: well appearing adult male in no acute distress, nondiaphoretic	  HEENT:   Normal oral mucosa, PERRL, EOMI	  Lymphatic: No lymphadenopathy , no edema  Cardiovascular: Normal S1 S2, No JVD, No murmurs , Peripheral pulses palpable 2+ bilaterally  Respiratory: Lungs clear to auscultation, normal effort 	  Gastrointestinal:  Soft, Non-tender, + BS	  Skin: No rashes, No ecchymoses, No cyanosis, warm to touch  Musculoskeletal: Normal range of motion, normal strength  Psychiatry:  Mood is "I'm nervous and anxious",  affect appropriate    TELEMETRY: NSR, RBBB, episodes of PAT with same QRS complex.  Episodes terminate spontaneously, but he demonstrates a few episodes back-to-back.	  On 2/3, no arrhythmia.  ECG: NSR, bursts of PAT, RBBB QRS.  Echo:  < from: Transthoracic Echocardiogram (01.31.23 @ 08:40) >  Dimensions:    Normal Values:  LA:     3.6    2.0 - 4.0 cm  Ao:     3.3    2.0 - 3.8 cm  SEPTUM: 1.2    0.6 - 1.2 cm  PWT:    1.1    0.6 - 1.1 cm  LVIDd:  4.0    3.0 - 5.6 cm  LVIDs:  2.5    1.8 - 4.0 cm  Derived variables:  LVMI: 67 g/m2  RWT: 0.55  Fractional short: 38 %  EF (Almaraz Rule): 53 %Doppler Peak Velocity (m/sec):  AoV=3.1  ------------------------------------------------------------------------  Observations:  Mitral Valve: Mitral annular calcification, otherwise  normal mitral valve. Mild mitral regurgitation.  Aortic Valve/Aorta: Calcified trileaflet aortic valve with  decreased opening. Peak transaortic valve gradient equals  38 mm Hg, mean transaortic valve gradient equals 27 mm Hg,  estimated aortic valve area equals 1.3 sqcm (by continuity  equation), aortic valve velocity time integral equals 73  cm, consistent with moderate aortic stenosis. Mild aortic  regurgitation.  Peak left ventricular outflow tract  gradient equals 5 mm Hg, mean gradient is equal to 2 mm Hg,  LVOT velocity time integral equals 22 cm.  Aortic Root: 3.3 cm.  LVOT diameter: 2.3 cm.  Left Atrium: Normal left atrium.  LA volume index = 29  cc/m2.  Left Ventricle: Normal left ventricular systolic function.  No segmental wall motion abnormalities. Increased relative  wall thickness with normal left ventricular mass index,  consistent with concentric left ventricular remodeling.  Mild diastolic dysfunction (Stage I).  Right Heart: Normal right atrium. Normal right ventricular  size and function. Normal tricuspid valve. Minimal  tricuspid regurgitation. Normal pulmonic valve. Minimal  pulmonic regurgitation.  Pericardium/Pleura: Normal pericardium with no pericardial  effusion.  Hemodynamic: Estimated right atrial pressure is 8 mm Hg.  Inadequate tricuspid regurgitation Doppler envelope  precludes estimation of RVSP.    < end of copied text >    < from: Transesophageal Echocardiogram w/o TTE (02.01.23 @ 14:31) >  Observations:  Mitral Valve: Normal mitral valve. Minimal mitral  regurgitation.  Aortic Valve/Aorta: Moderate aortic stenosis (see recent  transthoracic echocardiogram).  Mild-moderate aortic regurgitation.  A small, calcified, mobile mass is attached to the aortic  valve leaflets on the aortic side of the valve, and is most  consistent with a Lambl's excresence.  Normal size aortic root, arch, and descending thoracic  aorta.  Mild atherosclerosis.  Left Atrium: Normal left atrium.  Left Ventricle: Normal left ventricular internal  dimensions. Concentric hypertrophy.  Normal left ventricular systolic function. No segmental  wall motion abnormalities.  Right Heart: Normal right atrium. Normal right ventricular  size and function.  Normal tricuspid valve.  Normal pulmonic valve. Minimal pulmonic regurgitation.  Pericardium/Pleura: Normal pericardium with no pericardial  effusion.  Hemodynamic: No evidence of pulmonary hypertension.    < end of copied text >    ASSESSMENT/PLAN: 	Mr Ribeiro is a pleasant 77y Male here for workup of infection. Found to have salvos of PAT on telemetry. He has a RBBB QRS on EKG.  At present, he feels anxious and stressed about his entire lifestyle, but has felt ill for the last week or two, and has been experiencing palpitations during the last 2-4 weeks.  Some unrelated episodes of lightheadedness, but no fainting. Echocardiogram reassuring- LVH without systolic dysfunction, Moderate AS but no endocarditis.  His telemetry reveals episodes of paroxysmal atrial tachycardia for 3-10sec at a time, sometimes with multiple salvos back to back.  These episodes are associated with his palpitations.  No sustained atrial fibrillation at this point. He has HTN, diabetes, but no known prior strokes.    For lack of evidence that ~10-20sec of irregular heartbeats can result in a stroke, he does not have "AFib" yet, and does not yet require oral anticoagulation.  At this time I would not recommend antiarrhythmic drugs.  Maintain telemetry, and maintain K4-4.5 and Mg 2 while hospitalized.  As an outpatient he should undergo cardiac rhythm monitoring (event monitors).  If he has sustained AFib (many minutes in a row), he should be anticoagulated then for stroke prevention.    His hemodynamics will be improved at lower heartrates in the setting of LVH. OK to titrate Metoprolol dose to a resting HR in the 60s.    s/p PIC line      office [phone]  pager [phone] EP     HISTORY OF PRESENT ILLNESS: HPI:  Patient is a 77 year old male w/pmh  of HTN, HLD, DM, asthma , memory impairment presenting for  shortness or breath for the past few weeks.   Patient reports recent flu infection about 4 weeks prior to admission. Since that time , patient  reports feeling fatigue , has a  non productive cough, intermittent nasal congestion,   associated with shortness of breath , increased dyspnea with exertion and decreased exercise tolerance .  He reports no chest pain , but does have palpitations , he has no lower extremity edema ,  he reports no fever or chills, no dysuria , no abdominal pain , no nausea or vomiting and no diarrhea.  of note , patient recently returned from a trip to [place]. His son was recently ill with a URI . He also reports increased social stressors during this time.    (29 Jan 2023 20:31)    Mr Ribeiro is a very pleasant 77yoM with self-described anxiety (not treated), and here with shortness of breath. 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  He has been experiencing palpitations for the last few weeks, prior to which no such symptoms.  Has been intermittently lightheaded at home - unrelated to timing of abnormal heartbeats.  No fainting. No orthopnea or leg edema.  Rapid response teams have been called to patient bedside for salvos of fast heartbeats seen on telemetry, which he has associated with "mild palpitations and sense of racing/urgency in his chest".  He's been treated with metoprolol IV.    A 10 pt ROS is otherwise negative.    2/3- resting in recliner.  feels badly- fatigued, no energy, poor appetite, but still wants to go home.  awaiting PICC line and home antibiotic setup. would rather recover and rest in his own home.  2/4  resting in bed, offers no complaints   Date of service 2/5- resting in bed, no new complaints. offers a multitude of non-CV questions/complaints, and is awaiting SD home.    PAST MEDICAL & SURGICAL HISTORY:  HTN (hypertension)  HLD (hyperlipidemia)  Diabetes  Asthma  ADHD  S/P appendectomy  H/O hernia repair      acetaminophen     Tablet .. 650 milliGRAM(s) Oral every 6 hours PRN  ALPRAZolam 0.25 milliGRAM(s) Oral once  aluminum hydroxide/magnesium hydroxide/simethicone Suspension 30 milliLiter(s) Oral every 4 hours PRN  aspirin enteric coated 81 milliGRAM(s) Oral daily  atorvastatin 80 milliGRAM(s) Oral at bedtime  budesonide 160 MICROgram(s)/formoterol 4.5 MICROgram(s) Inhaler 2 Puff(s) Inhalation two times a day  cefTRIAXone   IVPB 2000 milliGRAM(s) IV Intermittent every 24 hours  chlorhexidine 4% Liquid 1 Application(s) Topical <User Schedule>  dextroamphetamine ER capsule 10 milliGRAM(s) 10 milliGRAM(s) Oral daily  dextrose 5%. 1000 milliLiter(s) IV Continuous <Continuous>  dextrose 5%. 1000 milliLiter(s) IV Continuous <Continuous>  dextrose 50% Injectable 25 Gram(s) IV Push once  dextrose 50% Injectable 12.5 Gram(s) IV Push once  dextrose Oral Gel 15 Gram(s) Oral once PRN  donepezil 5 milliGRAM(s) Oral at bedtime  Gemtesa 75mg Tablet 1 Tablet(s) 1 Tablet(s) Oral daily  glucagon  Injectable 1 milliGRAM(s) IntraMuscular once  heparin   Injectable 5000 Unit(s) SubCutaneous every 8 hours  insulin glargine Injectable (LANTUS) 16 Unit(s) SubCutaneous at bedtime  insulin lispro (ADMELOG) corrective regimen sliding scale   SubCutaneous three times a day before meals  insulin lispro (ADMELOG) corrective regimen sliding scale   SubCutaneous at bedtime  losartan 100 milliGRAM(s) Oral daily  melatonin 3 milliGRAM(s) Oral at bedtime PRN  metoprolol tartrate 25 milliGRAM(s) Oral every 12 hours  montelukast 10 milliGRAM(s) Oral daily  ondansetron Injectable 4 milliGRAM(s) IV Push every 8 hours PRN  pantoprazole    Tablet 40 milliGRAM(s) Oral before breakfast  polyethylene glycol 3350 17 Gram(s) Oral daily PRN  senna 2 Tablet(s) Oral at bedtime  sodium chloride 0.9% lock flush 10 milliLiter(s) IV Push every 1 hour PRN  tadalafil 5 milliGRAM(s) Oral daily      T(C): 36.7 (02-05-23 @ 04:29), Max: 37.1 (02-05-23 @ 00:49)  HR: 88 (02-05-23 @ 04:29) (82 - 88)  BP: 129/64 (02-05-23 @ 04:29) (129/64 - 175/98)  RR: 18 (02-05-23 @ 04:29) (18 - 18)  SpO2: 96% (02-05-23 @ 04:29) (96% - 99%)  Wt(kg): --    I&O's Summary    04 Feb 2023 07:01  -  05 Feb 2023 07:00  --------------------------------------------------------  IN: 240 mL / OUT: 0 mL / NET: 240 mL    Appearance: well appearing adult male in no acute distress, nondiaphoretic	  HEENT:   Normal oral mucosa, PERRL, EOMI	  Lymphatic: No lymphadenopathy , no edema  Cardiovascular: Normal S1 S2, No JVD, No murmurs , Peripheral pulses palpable 2+ bilaterally  Respiratory: Lungs clear to auscultation, normal effort 	  Gastrointestinal:  Soft, Non-tender, + BS	  Skin: No rashes, No ecchymoses, No cyanosis, warm to touch  Musculoskeletal: Normal range of motion, normal strength  Psychiatry:  Mood is "I'm nervous and anxious",  affect appropriate    TELEMETRY: NSR. RBBB, bursts of PAT.  ECG: NSR, bursts of PAT, RBBB QRS.  Echo:  < from: Transthoracic Echocardiogram (01.31.23 @ 08:40) >  Dimensions:    Normal Values:  LA:     3.6    2.0 - 4.0 cm  Ao:     3.3    2.0 - 3.8 cm  SEPTUM: 1.2    0.6 - 1.2 cm  PWT:    1.1    0.6 - 1.1 cm  LVIDd:  4.0    3.0 - 5.6 cm  LVIDs:  2.5    1.8 - 4.0 cm  Derived variables:  LVMI: 67 g/m2  RWT: 0.55  Fractional short: 38 %  EF (Almaraz Rule): 53 %Doppler Peak Velocity (m/sec):  AoV=3.1  ------------------------------------------------------------------------  Observations:  Mitral Valve: Mitral annular calcification, otherwise  normal mitral valve. Mild mitral regurgitation.  Aortic Valve/Aorta: Calcified trileaflet aortic valve with  decreased opening. Peak transaortic valve gradient equals  38 mm Hg, mean transaortic valve gradient equals 27 mm Hg,  estimated aortic valve area equals 1.3 sqcm (by continuity  equation), aortic valve velocity time integral equals 73  cm, consistent with moderate aortic stenosis. Mild aortic  regurgitation.  Peak left ventricular outflow tract  gradient equals 5 mm Hg, mean gradient is equal to 2 mm Hg,  LVOT velocity time integral equals 22 cm.  Aortic Root: 3.3 cm.  LVOT diameter: 2.3 cm.  Left Atrium: Normal left atrium.  LA volume index = 29  cc/m2.  Left Ventricle: Normal left ventricular systolic function.  No segmental wall motion abnormalities. Increased relative  wall thickness with normal left ventricular mass index,  consistent with concentric left ventricular remodeling.  Mild diastolic dysfunction (Stage I).  Right Heart: Normal right atrium. Normal right ventricular  size and function. Normal tricuspid valve. Minimal  tricuspid regurgitation. Normal pulmonic valve. Minimal  pulmonic regurgitation.  Pericardium/Pleura: Normal pericardium with no pericardial  effusion.  Hemodynamic: Estimated right atrial pressure is 8 mm Hg.  Inadequate tricuspid regurgitation Doppler envelope  precludes estimation of RVSP.    < end of copied text >    < from: Transesophageal Echocardiogram w/o TTE (02.01.23 @ 14:31) >  Observations:  Mitral Valve: Normal mitral valve. Minimal mitral  regurgitation.  Aortic Valve/Aorta: Moderate aortic stenosis (see recent  transthoracic echocardiogram).  Mild-moderate aortic regurgitation.  A small, calcified, mobile mass is attached to the aortic  valve leaflets on the aortic side of the valve, and is most  consistent with a Lambl's excresence.  Normal size aortic root, arch, and descending thoracic  aorta.  Mild atherosclerosis.  Left Atrium: Normal left atrium.  Left Ventricle: Normal left ventricular internal  dimensions. Concentric hypertrophy.  Normal left ventricular systolic function. No segmental  wall motion abnormalities.  Right Heart: Normal right atrium. Normal right ventricular  size and function.  Normal tricuspid valve.  Normal pulmonic valve. Minimal pulmonic regurgitation.  Pericardium/Pleura: Normal pericardium with no pericardial  effusion.  Hemodynamic: No evidence of pulmonary hypertension.    < end of copied text >    ASSESSMENT/PLAN: 	Mr Ribeiro is a pleasant 77y Male here for workup of infection. Found to have salvos of PAT on telemetry. He has a RBBB QRS on EKG.  At present, he feels anxious and stressed about his entire lifestyle, but has felt ill for the last week or two, and has been experiencing palpitations during the last 2-4 weeks.  Some unrelated episodes of lightheadedness, but no fainting. Echocardiogram reassuring- LVH without systolic dysfunction, Moderate AS but no endocarditis.  His telemetry reveals episodes of paroxysmal atrial tachycardia for 3-10sec at a time, sometimes with multiple salvos back to back.  These episodes are associated with his palpitations.  No sustained atrial fibrillation at this point. He has HTN, diabetes, but no known prior strokes.    For lack of evidence that ~10-20sec of irregular heartbeats can result in a stroke, he does not have "AFib" yet, and does not yet require oral anticoagulation.  At this time I would not recommend antiarrhythmic drugs.  Maintain telemetry, and maintain K4-4.5 and Mg 2 while hospitalized.  As an outpatient he should undergo cardiac rhythm monitoring (event monitors).  If he has sustained AFib (many minutes in a row), he should be anticoagulated then for stroke prevention.    His hemodynamics will be improved at lower heartrates in the setting of LVH. OK to titrate Metoprolol dose to a resting HR in the 60s: increase to 50mg BID.    s/p PICC line  awaiting home antibiotic setup to go home.      office [phone]  pager [phone]

## 2023-02-04 NOTE — PROGRESS NOTE ADULT - SUBJECTIVE AND OBJECTIVE BOX
Name of Patient : MARY JANE CORONADO  MRN: [id number]  Date of visit: 02-04-23       Subjective: Patient seen and examined. No new events except as noted.   doing okay    REVIEW OF SYSTEMS:    CONSTITUTIONAL: No weakness, fevers or chills  EYES/ENT: No visual changes;  No vertigo or throat pain   NECK: No pain or stiffness  RESPIRATORY: No cough, wheezing, hemoptysis; No shortness of breath  CARDIOVASCULAR: No chest pain or palpitations  GASTROINTESTINAL: No abdominal or epigastric pain. No nausea, vomiting, or hematemesis; No diarrhea or constipation. No melena or hematochezia.  GENITOURINARY: No dysuria, frequency or hematuria  NEUROLOGICAL: No numbness or weakness  SKIN: No itching, burning, rashes, or lesions   All other review of systems is negative unless indicated above.    MEDICATIONS:  MEDICATIONS  (STANDING):  ALPRAZolam 0.25 milliGRAM(s) Oral once  aspirin enteric coated 81 milliGRAM(s) Oral daily  atorvastatin 80 milliGRAM(s) Oral at bedtime  budesonide 160 MICROgram(s)/formoterol 4.5 MICROgram(s) Inhaler 2 Puff(s) Inhalation two times a day  cefTRIAXone   IVPB 2000 milliGRAM(s) IV Intermittent every 24 hours  chlorhexidine 4% Liquid 1 Application(s) Topical <User Schedule>  dextroamphetamine ER capsule 10 milliGRAM(s) 10 milliGRAM(s) Oral daily  dextrose 5%. 1000 milliLiter(s) (100 mL/Hr) IV Continuous <Continuous>  dextrose 5%. 1000 milliLiter(s) (50 mL/Hr) IV Continuous <Continuous>  dextrose 50% Injectable 25 Gram(s) IV Push once  dextrose 50% Injectable 12.5 Gram(s) IV Push once  donepezil 5 milliGRAM(s) Oral at bedtime  Gemtesa 75mg Tablet 1 Tablet(s) 1 Tablet(s) Oral daily  glucagon  Injectable 1 milliGRAM(s) IntraMuscular once  heparin   Injectable 5000 Unit(s) SubCutaneous every 8 hours  insulin glargine Injectable (LANTUS) 16 Unit(s) SubCutaneous at bedtime  insulin lispro (ADMELOG) corrective regimen sliding scale   SubCutaneous three times a day before meals  insulin lispro (ADMELOG) corrective regimen sliding scale   SubCutaneous at bedtime  losartan 100 milliGRAM(s) Oral daily  metoprolol tartrate 25 milliGRAM(s) Oral every 12 hours  montelukast 10 milliGRAM(s) Oral daily  pantoprazole    Tablet 40 milliGRAM(s) Oral before breakfast  senna 2 Tablet(s) Oral at bedtime  tadalafil 5 milliGRAM(s) Oral daily      PHYSICAL EXAM:  T(C): 36.8 (02-04-23 @ 12:14), Max: 36.8 (02-04-23 @ 12:14)  HR: 82 (02-04-23 @ 12:14) (72 - 82)  BP: 175/98 (02-04-23 @ 12:14) (166/93 - 175/98)  RR: 18 (02-04-23 @ 12:14) (18 - 18)  SpO2: 97% (02-04-23 @ 12:14) (97% - 97%)  Wt(kg): --  I&O's Summary    03 Feb 2023 07:01  -  04 Feb 2023 07:00  --------------------------------------------------------  IN: 480 mL / OUT: 0 mL / NET: 480 mL    04 Feb 2023 07:01  -  04 Feb 2023 19:41  --------------------------------------------------------  IN: 240 mL / OUT: 0 mL / NET: 240 mL          Appearance: Normal	  HEENT:  PERRLA   Lymphatic: No lymphadenopathy   Cardiovascular: Normal S1 S2, no JVD  Respiratory: normal effort , clear  Gastrointestinal:  Soft, Non-tender  Skin: No rashes,  warm to touch  Psychiatry:  Mood & affect appropriate  Musculuskeletal: No edema    recent labs, Imaging and EKGs personally reviewed     02-03-23 @ 07:01  -  02-04-23 @ 07:00  --------------------------------------------------------  IN: 480 mL / OUT: 0 mL / NET: 480 mL    02-04-23 @ 07:01  -  02-04-23 @ 19:41  --------------------------------------------------------  IN: 240 mL / OUT: 0 mL / NET: 240 mL                          10.9   9.44  )-----------( 428      ( 03 Feb 2023 07:08 )             34.2               02-03    137  |  101  |  11  ----------------------------<  106<H>  4.1   |  23  |  0.56    Ca    9.8      03 Feb 2023 07:08

## 2023-02-05 ENCOUNTER — TRANSCRIPTION ENCOUNTER (OUTPATIENT)
Age: 78
End: 2023-02-05

## 2023-02-05 RX ORDER — METOPROLOL TARTRATE 50 MG
50 TABLET ORAL
Refills: 0 | Status: DISCONTINUED | OUTPATIENT
Start: 2023-02-05 | End: 2023-02-06

## 2023-02-05 RX ADMIN — INSULIN GLARGINE 16 UNIT(S): 100 INJECTION, SOLUTION SUBCUTANEOUS at 22:24

## 2023-02-05 RX ADMIN — Medication 25 MILLIGRAM(S): at 05:28

## 2023-02-05 RX ADMIN — CEFTRIAXONE 100 MILLIGRAM(S): 500 INJECTION, POWDER, FOR SOLUTION INTRAMUSCULAR; INTRAVENOUS at 13:41

## 2023-02-05 RX ADMIN — BUDESONIDE AND FORMOTEROL FUMARATE DIHYDRATE 2 PUFF(S): 160; 4.5 AEROSOL RESPIRATORY (INHALATION) at 17:47

## 2023-02-05 RX ADMIN — ATORVASTATIN CALCIUM 80 MILLIGRAM(S): 80 TABLET, FILM COATED ORAL at 21:52

## 2023-02-05 RX ADMIN — BUDESONIDE AND FORMOTEROL FUMARATE DIHYDRATE 2 PUFF(S): 160; 4.5 AEROSOL RESPIRATORY (INHALATION) at 05:27

## 2023-02-05 RX ADMIN — LOSARTAN POTASSIUM 100 MILLIGRAM(S): 100 TABLET, FILM COATED ORAL at 05:28

## 2023-02-05 RX ADMIN — MONTELUKAST 10 MILLIGRAM(S): 4 TABLET, CHEWABLE ORAL at 11:49

## 2023-02-05 RX ADMIN — HEPARIN SODIUM 5000 UNIT(S): 5000 INJECTION INTRAVENOUS; SUBCUTANEOUS at 05:28

## 2023-02-05 RX ADMIN — PANTOPRAZOLE SODIUM 40 MILLIGRAM(S): 20 TABLET, DELAYED RELEASE ORAL at 05:28

## 2023-02-05 RX ADMIN — HEPARIN SODIUM 5000 UNIT(S): 5000 INJECTION INTRAVENOUS; SUBCUTANEOUS at 22:26

## 2023-02-05 RX ADMIN — SENNA PLUS 2 TABLET(S): 8.6 TABLET ORAL at 21:49

## 2023-02-05 RX ADMIN — Medication 50 MILLIGRAM(S): at 17:50

## 2023-02-05 RX ADMIN — POLYETHYLENE GLYCOL 3350 17 GRAM(S): 17 POWDER, FOR SOLUTION ORAL at 17:50

## 2023-02-05 RX ADMIN — Medication 2: at 13:18

## 2023-02-05 RX ADMIN — ONDANSETRON 4 MILLIGRAM(S): 8 TABLET, FILM COATED ORAL at 21:46

## 2023-02-05 RX ADMIN — Medication 81 MILLIGRAM(S): at 11:49

## 2023-02-05 RX ADMIN — CHLORHEXIDINE GLUCONATE 1 APPLICATION(S): 213 SOLUTION TOPICAL at 05:33

## 2023-02-05 RX ADMIN — HEPARIN SODIUM 5000 UNIT(S): 5000 INJECTION INTRAVENOUS; SUBCUTANEOUS at 13:41

## 2023-02-05 RX ADMIN — DONEPEZIL HYDROCHLORIDE 5 MILLIGRAM(S): 10 TABLET, FILM COATED ORAL at 21:50

## 2023-02-05 NOTE — PROGRESS NOTE ADULT - NS ATTEND AMEND GEN_ALL_CORE FT
Patient care and plan discussed and reviewed with Advanced Care Provider. Plan as outlined above edited by me to reflect our discussion.
Patient care and plan discussed and reviewed with Advanced Care Provider. Plan as outlined above edited by me to reflect our discussion.
Pt care and plan discussed and reviewed with PA. Plan as outlined above edited by me to reflect our discussion. Advanced care planning/advanced directives discussed with patient/family. DNR status including forceful chest compressions to attempt to restart the heart, ventilator support/artificial breathing, electric shock, artificial nutrition, health care proxy, Molst form all discussed with pt. Pt wishes to consider.
resting comfortably. no angina or palpitations. awaiting having a bowel movement.  has his PICC line. awaiting home antibiotic setup.  no new complaints.
Pt care and plan discussed and reviewed with PA. Plan as outlined above edited by me to reflect our discussion. Advanced care planning/advanced directives discussed with patient/family. DNR status including forceful chest compressions to attempt to restart the heart, ventilator support/artificial breathing, electric shock, artificial nutrition, health care proxy, Molst form all discussed with pt. Pt wishes to consider.
Patient care and plan discussed and reviewed with Advanced Care Provider. Plan as outlined above edited by me to reflect our discussion.
Pt care and plan discussed and reviewed with PA. Plan as outlined above edited by me to reflect our discussion. Advanced care planning/advanced directives discussed with patient/family. DNR status including forceful chest compressions to attempt to restart the heart, ventilator support/artificial breathing, electric shock, artificial nutrition, health care proxy, Molst form all discussed with pt. Pt wishes to consider.    Discussed with patient and his wife over the phone
Pt care and plan discussed and reviewed with PA. Plan as outlined above edited by me to reflect our discussion. Advanced care planning/advanced directives discussed with patient/family. DNR status including forceful chest compressions to attempt to restart the heart, ventilator support/artificial breathing, electric shock, artificial nutrition, health care proxy, Molst form all discussed with pt. Pt wishes to consider.

## 2023-02-05 NOTE — PROVIDER CONTACT NOTE (OTHER) - SITUATION
pt states he feels SOB and lightheaded when speaking to family
Pt HR when up to 166 on telemetry overnight

## 2023-02-05 NOTE — PROGRESS NOTE ADULT - SUBJECTIVE AND OBJECTIVE BOX
DATE OF SERVICE: 02-05-23 @ 10:35    Patient is a 77y old  Male who presents with a chief complaint of sob x few weeks (05 Feb 2023 10:21)      INTERVAL HISTORY: Feels ok.     REVIEW OF SYSTEMS:  CONSTITUTIONAL: No weakness  EYES/ENT: No visual changes;  No throat pain   NECK: No pain or stiffness  RESPIRATORY: No cough, wheezing; No shortness of breath  CARDIOVASCULAR: No chest pain or palpitations  GASTROINTESTINAL: No abdominal  pain. No nausea, vomiting, or hematemesis  GENITOURINARY: No dysuria, frequency or hematuria  NEUROLOGICAL: No stroke like symptoms  SKIN: No rashes    TELEMETRY Personally reviewed: SR 60-80  for 15 sec, PAC/PVCs  	  MEDICATIONS:  losartan 100 milliGRAM(s) Oral daily  metoprolol tartrate 25 milliGRAM(s) Oral every 12 hours  tadalafil 5 milliGRAM(s) Oral daily        PHYSICAL EXAM:  T(C): 36.7 (02-05-23 @ 04:29), Max: 37.1 (02-05-23 @ 00:49)  HR: 88 (02-05-23 @ 04:29) (82 - 88)  BP: 129/64 (02-05-23 @ 04:29) (129/64 - 175/98)  RR: 18 (02-05-23 @ 04:29) (18 - 18)  SpO2: 96% (02-05-23 @ 04:29) (96% - 99%)  Wt(kg): --  I&O's Summary    04 Feb 2023 07:01  -  05 Feb 2023 07:00  --------------------------------------------------------  IN: 240 mL / OUT: 0 mL / NET: 240 mL          Appearance: In no distress	  HEENT:    PERRL, EOMI	  Cardiovascular:  S1 S2, No JVD  Respiratory: Lungs clear to auscultation	  Gastrointestinal:  Soft, Non-tender, + BS	  Vascularature:  No edema of LE  Psychiatric: Appropriate affect   Neuro: no acute focal deficits         Labs personally reviewed      ASSESSMENT/PLAN: 	      Patient is a 77 year old male w/pmh  of HTN, HLD, DM, asthma , memory impairment presenting for  shortness or breath for the past few weeks. He reports no chest pain, LE edema, fever, chills, N/V/D but does have palpitations.  Followed by OP Cardiologist, Dr. Siu.      Problem/Plan -1  Problem: Shortness of Breath  - ECG NSR, RBBB (no previous ECG available to compare)  - Trop nondiagnostic 57 --> 65  - CRP elevated  - CT neg for PE (heart size normal, no pericardial effusion)  - CXR with clear lungs  - pro BNP 93  - TTE shows EF53%, moderate AS, concentric LV remodeling, no WMA, mild DD, normal RV  - c/w ASA 81mg PO and Atorvastatin 80mg PO daily  - given very elevated ESR, FUO and SOB will consider r/o endocarditis   - Follow up repeat Blood Cx. HAIM no vegetation  - given typical organism, will treat for endocarditis per ID, s/p PICC 2/5 for long term abx    Problem/Plan -2  Problem:  Hypertension  - c/w Losartan 100mg PO daily  - c/w Metoprolol     Problem/Plan -3  Problem: Hyperlipidemia  - LDL 69  - c/w Atorvastatin 80mg PO daily    Problem/Plan -4  Problem: PAT  - Now with increased PAC/PVC and 15 sec PAT up to the 170s  - Will increase Metoprolol to 50mg PO BID      CIARA Mancini-ASHLEY Candelaria DO [facility]  Cardiovascular Medicine  [address] [place] [address], [address]  Available through call or text on [employer]  Office: [phone]

## 2023-02-05 NOTE — PROGRESS NOTE ADULT - SUBJECTIVE AND OBJECTIVE BOX
EP     HISTORY OF PRESENT ILLNESS: HPI:  Patient is a 77 year old male w/pmh  of HTN, HLD, DM, asthma , memory impairment presenting for  shortness or breath for the past few weeks.   Patient reports recent flu infection about 4 weeks prior to admission. Since that time , patient  reports feeling fatigue , has a  non productive cough, intermittent nasal congestion,   associated with shortness of breath , increased dyspnea with exertion and decreased exercise tolerance .  He reports no chest pain , but does have palpitations , he has no lower extremity edema ,  he reports no fever or chills, no dysuria , no abdominal pain , no nausea or vomiting and no diarrhea.  of note , patient recently returned from a trip to [place]. His son was recently ill with a URI . He also reports increased social stressors during this time.    (29 Jan 2023 20:31)    Mr Ribeiro is a very pleasant 77yoM with self-described anxiety (not treated), and here with shortness of breath. 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  He has been experiencing palpitations for the last few weeks, prior to which no such symptoms.  Has been intermittently lightheaded at home - unrelated to timing of abnormal heartbeats.  No fainting. No orthopnea or leg edema.  Rapid response teams have been called to patient bedside for salvos of fast heartbeats seen on telemetry, which he has associated with "mild palpitations and sense of racing/urgency in his chest".  He's been treated with metoprolol IV.    A 10 pt ROS is otherwise negative.    2/3- resting in recliner.  feels badly- fatigued, no energy, poor appetite, but still wants to go home.  awaiting PICC line and home antibiotic setup. would rather recover and rest in his own home.  2/4  resting in bed, offers no complaints   Date of service 2/5- resting in bed, no new complaints. offers a multitude of non-CV questions/complaints, and is awaiting MT home.    PAST MEDICAL & SURGICAL HISTORY:  HTN (hypertension)  HLD (hyperlipidemia)  Diabetes  Asthma  ADHD  S/P appendectomy  H/O hernia repair      acetaminophen     Tablet .. 650 milliGRAM(s) Oral every 6 hours PRN  ALPRAZolam 0.25 milliGRAM(s) Oral once  aluminum hydroxide/magnesium hydroxide/simethicone Suspension 30 milliLiter(s) Oral every 4 hours PRN  aspirin enteric coated 81 milliGRAM(s) Oral daily  atorvastatin 80 milliGRAM(s) Oral at bedtime  budesonide 160 MICROgram(s)/formoterol 4.5 MICROgram(s) Inhaler 2 Puff(s) Inhalation two times a day  cefTRIAXone   IVPB 2000 milliGRAM(s) IV Intermittent every 24 hours  chlorhexidine 4% Liquid 1 Application(s) Topical <User Schedule>  dextroamphetamine ER capsule 10 milliGRAM(s) 10 milliGRAM(s) Oral daily  dextrose 5%. 1000 milliLiter(s) IV Continuous <Continuous>  dextrose 5%. 1000 milliLiter(s) IV Continuous <Continuous>  dextrose 50% Injectable 25 Gram(s) IV Push once  dextrose 50% Injectable 12.5 Gram(s) IV Push once  dextrose Oral Gel 15 Gram(s) Oral once PRN  donepezil 5 milliGRAM(s) Oral at bedtime  Gemtesa 75mg Tablet 1 Tablet(s) 1 Tablet(s) Oral daily  glucagon  Injectable 1 milliGRAM(s) IntraMuscular once  heparin   Injectable 5000 Unit(s) SubCutaneous every 8 hours  insulin glargine Injectable (LANTUS) 16 Unit(s) SubCutaneous at bedtime  insulin lispro (ADMELOG) corrective regimen sliding scale   SubCutaneous three times a day before meals  insulin lispro (ADMELOG) corrective regimen sliding scale   SubCutaneous at bedtime  losartan 100 milliGRAM(s) Oral daily  melatonin 3 milliGRAM(s) Oral at bedtime PRN  metoprolol tartrate 25 milliGRAM(s) Oral every 12 hours  montelukast 10 milliGRAM(s) Oral daily  ondansetron Injectable 4 milliGRAM(s) IV Push every 8 hours PRN  pantoprazole    Tablet 40 milliGRAM(s) Oral before breakfast  polyethylene glycol 3350 17 Gram(s) Oral daily PRN  senna 2 Tablet(s) Oral at bedtime  sodium chloride 0.9% lock flush 10 milliLiter(s) IV Push every 1 hour PRN  tadalafil 5 milliGRAM(s) Oral daily      T(C): 36.7 (02-05-23 @ 04:29), Max: 37.1 (02-05-23 @ 00:49)  HR: 88 (02-05-23 @ 04:29) (82 - 88)  BP: 129/64 (02-05-23 @ 04:29) (129/64 - 175/98)  RR: 18 (02-05-23 @ 04:29) (18 - 18)  SpO2: 96% (02-05-23 @ 04:29) (96% - 99%)  Wt(kg): --    I&O's Summary    04 Feb 2023 07:01  -  05 Feb 2023 07:00  --------------------------------------------------------  IN: 240 mL / OUT: 0 mL / NET: 240 mL    Appearance: well appearing adult male in no acute distress, nondiaphoretic	  HEENT:   Normal oral mucosa, PERRL, EOMI	  Lymphatic: No lymphadenopathy , no edema  Cardiovascular: Normal S1 S2, No JVD, No murmurs , Peripheral pulses palpable 2+ bilaterally  Respiratory: Lungs clear to auscultation, normal effort 	  Gastrointestinal:  Soft, Non-tender, + BS	  Skin: No rashes, No ecchymoses, No cyanosis, warm to touch  Musculoskeletal: Normal range of motion, normal strength  Psychiatry:  Mood is "I'm nervous and anxious",  affect appropriate    TELEMETRY: NSR. RBBB, bursts of PAT.  ECG: NSR, bursts of PAT, RBBB QRS.  Echo:  < from: Transthoracic Echocardiogram (01.31.23 @ 08:40) >  Dimensions:    Normal Values:  LA:     3.6    2.0 - 4.0 cm  Ao:     3.3    2.0 - 3.8 cm  SEPTUM: 1.2    0.6 - 1.2 cm  PWT:    1.1    0.6 - 1.1 cm  LVIDd:  4.0    3.0 - 5.6 cm  LVIDs:  2.5    1.8 - 4.0 cm  Derived variables:  LVMI: 67 g/m2  RWT: 0.55  Fractional short: 38 %  EF (Almaraz Rule): 53 %Doppler Peak Velocity (m/sec):  AoV=3.1  ------------------------------------------------------------------------  Observations:  Mitral Valve: Mitral annular calcification, otherwise  normal mitral valve. Mild mitral regurgitation.  Aortic Valve/Aorta: Calcified trileaflet aortic valve with  decreased opening. Peak transaortic valve gradient equals  38 mm Hg, mean transaortic valve gradient equals 27 mm Hg,  estimated aortic valve area equals 1.3 sqcm (by continuity  equation), aortic valve velocity time integral equals 73  cm, consistent with moderate aortic stenosis. Mild aortic  regurgitation.  Peak left ventricular outflow tract  gradient equals 5 mm Hg, mean gradient is equal to 2 mm Hg,  LVOT velocity time integral equals 22 cm.  Aortic Root: 3.3 cm.  LVOT diameter: 2.3 cm.  Left Atrium: Normal left atrium.  LA volume index = 29  cc/m2.  Left Ventricle: Normal left ventricular systolic function.  No segmental wall motion abnormalities. Increased relative  wall thickness with normal left ventricular mass index,  consistent with concentric left ventricular remodeling.  Mild diastolic dysfunction (Stage I).  Right Heart: Normal right atrium. Normal right ventricular  size and function. Normal tricuspid valve. Minimal  tricuspid regurgitation. Normal pulmonic valve. Minimal  pulmonic regurgitation.  Pericardium/Pleura: Normal pericardium with no pericardial  effusion.  Hemodynamic: Estimated right atrial pressure is 8 mm Hg.  Inadequate tricuspid regurgitation Doppler envelope  precludes estimation of RVSP.    < end of copied text >    < from: Transesophageal Echocardiogram w/o TTE (02.01.23 @ 14:31) >  Observations:  Mitral Valve: Normal mitral valve. Minimal mitral  regurgitation.  Aortic Valve/Aorta: Moderate aortic stenosis (see recent  transthoracic echocardiogram).  Mild-moderate aortic regurgitation.  A small, calcified, mobile mass is attached to the aortic  valve leaflets on the aortic side of the valve, and is most  consistent with a Lambl's excresence.  Normal size aortic root, arch, and descending thoracic  aorta.  Mild atherosclerosis.  Left Atrium: Normal left atrium.  Left Ventricle: Normal left ventricular internal  dimensions. Concentric hypertrophy.  Normal left ventricular systolic function. No segmental  wall motion abnormalities.  Right Heart: Normal right atrium. Normal right ventricular  size and function.  Normal tricuspid valve.  Normal pulmonic valve. Minimal pulmonic regurgitation.  Pericardium/Pleura: Normal pericardium with no pericardial  effusion.  Hemodynamic: No evidence of pulmonary hypertension.    < end of copied text >    ASSESSMENT/PLAN: 	Mr Ribeiro is a pleasant 77y Male here for workup of infection. Found to have salvos of PAT on telemetry. He has a RBBB QRS on EKG.  At present, he feels anxious and stressed about his entire lifestyle, but has felt ill for the last week or two, and has been experiencing palpitations during the last 2-4 weeks.  Some unrelated episodes of lightheadedness, but no fainting. Echocardiogram reassuring- LVH without systolic dysfunction, Moderate AS but no endocarditis.  His telemetry reveals episodes of paroxysmal atrial tachycardia for 3-10sec at a time, sometimes with multiple salvos back to back.  These episodes are associated with his palpitations.  No sustained atrial fibrillation at this point. He has HTN, diabetes, but no known prior strokes.    For lack of evidence that ~10-20sec of irregular heartbeats can result in a stroke, he does not have "AFib" yet, and does not yet require oral anticoagulation.  At this time I would not recommend antiarrhythmic drugs.  Maintain telemetry, and maintain K4-4.5 and Mg 2 while hospitalized.  As an outpatient he should undergo cardiac rhythm monitoring (event monitors).  If he has sustained AFib (many minutes in a row), he should be anticoagulated then for stroke prevention.    His hemodynamics will be improved at lower heartrates in the setting of LVH. OK to titrate Metoprolol dose to a resting HR in the 60s: increase to 50mg BID.    s/p PICC line  awaiting home antibiotic setup to go home.      office [phone]  pager [phone]

## 2023-02-05 NOTE — PROGRESS NOTE ADULT - SUBJECTIVE AND OBJECTIVE BOX
Name of Patient : MARY JANE CORONADO  MRN: [id number]        Subjective: Patient seen and examined. No new events except as noted.       REVIEW OF SYSTEMS:    CONSTITUTIONAL: No weakness, fevers or chills  EYES/ENT: No visual changes;  No vertigo or throat pain   NECK: No pain or stiffness  RESPIRATORY: No cough, wheezing, hemoptysis; No shortness of breath  CARDIOVASCULAR: No chest pain or palpitations  GASTROINTESTINAL: No abdominal or epigastric pain. No nausea, vomiting, or hematemesis; No diarrhea or constipation. No melena or hematochezia.  GENITOURINARY: No dysuria, frequency or hematuria  NEUROLOGICAL: No numbness or weakness  SKIN: No itching, burning, rashes, or lesions   All other review of systems is negative unless indicated above.    MEDICATIONS:  MEDICATIONS  (STANDING):  ALPRAZolam 0.25 milliGRAM(s) Oral once  aspirin enteric coated 81 milliGRAM(s) Oral daily  atorvastatin 80 milliGRAM(s) Oral at bedtime  budesonide 160 MICROgram(s)/formoterol 4.5 MICROgram(s) Inhaler 2 Puff(s) Inhalation two times a day  cefTRIAXone   IVPB 2000 milliGRAM(s) IV Intermittent every 24 hours  chlorhexidine 4% Liquid 1 Application(s) Topical <User Schedule>  dextroamphetamine ER capsule 10 milliGRAM(s) 10 milliGRAM(s) Oral daily  dextrose 5%. 1000 milliLiter(s) (100 mL/Hr) IV Continuous <Continuous>  dextrose 5%. 1000 milliLiter(s) (50 mL/Hr) IV Continuous <Continuous>  dextrose 50% Injectable 25 Gram(s) IV Push once  dextrose 50% Injectable 12.5 Gram(s) IV Push once  donepezil 5 milliGRAM(s) Oral at bedtime  Gemtesa 75mg Tablet 1 Tablet(s) 1 Tablet(s) Oral daily  glucagon  Injectable 1 milliGRAM(s) IntraMuscular once  heparin   Injectable 5000 Unit(s) SubCutaneous every 8 hours  insulin glargine Injectable (LANTUS) 16 Unit(s) SubCutaneous at bedtime  insulin lispro (ADMELOG) corrective regimen sliding scale   SubCutaneous at bedtime  insulin lispro (ADMELOG) corrective regimen sliding scale   SubCutaneous three times a day before meals  losartan 100 milliGRAM(s) Oral daily  metoprolol tartrate 50 milliGRAM(s) Oral two times a day  montelukast 10 milliGRAM(s) Oral daily  pantoprazole    Tablet 40 milliGRAM(s) Oral before breakfast  senna 2 Tablet(s) Oral at bedtime  tadalafil 5 milliGRAM(s) Oral daily      PHYSICAL EXAM:  T(C): 36.7 (02-05-23 @ 12:01), Max: 37.1 (02-05-23 @ 00:49)  HR: 85 (02-05-23 @ 12:01) (85 - 88)  BP: 119/69 (02-05-23 @ 12:01) (119/69 - 151/84)  RR: 18 (02-05-23 @ 12:01) (18 - 18)  SpO2: 98% (02-05-23 @ 13:35) (96% - 99%)  Wt(kg): --  I&O's Summary    04 Feb 2023 07:01  -  05 Feb 2023 07:00  --------------------------------------------------------  IN: 240 mL / OUT: 0 mL / NET: 240 mL    05 Feb 2023 07:01  -  05 Feb 2023 19:14  --------------------------------------------------------  IN: 120 mL / OUT: 0 mL / NET: 120 mL          Appearance: Normal	  HEENT:  PERRLA   Lymphatic: No lymphadenopathy   Cardiovascular: Normal S1 S2, no JVD  Respiratory: normal effort , clear  Gastrointestinal:  Soft, Non-tender  Skin: No rashes,  warm to touch  Psychiatry:  Mood & affect appropriate  Musculuskeletal: No edema    recent labs, Imaging and EKGs personally reviewed     02-04-23 @ 07:01  -  02-05-23 @ 07:00  --------------------------------------------------------  IN: 240 mL / OUT: 0 mL / NET: 240 mL    02-05-23 @ 07:01  -  02-05-23 @ 19:14  --------------------------------------------------------  IN: 120 mL / OUT: 0 mL / NET: 120 mL

## 2023-02-05 NOTE — PROVIDER CONTACT NOTE (OTHER) - ACTION/TREATMENT ORDERED:
NP notified and aware, physical therapy evaluation ordered, continue to monitor
ASHLEY Hazel aware. Will continue to monitor pt.

## 2023-02-05 NOTE — DISCHARGE NOTE NURSING/CASE MANAGEMENT/SOCIAL WORK - PATIENT PORTAL LINK FT
You can access the FollowMyHealth Patient Portal offered by [facility] by registering at the following website: [email or web address]. By joining [employer]’s FollowMyHealth portal, you will also be able to view your health information using other applications (apps) compatible with our system.

## 2023-02-05 NOTE — PROVIDER CONTACT NOTE (OTHER) - ASSESSMENT
pt is a+ox4 forgetful, pt states he feels SOB and lightheaded as he speaks to his family at bedside while sitting in the chair. bp lying 118/73, hr 87, bp sitting 127/78, hr 80, bp standing 118/71 hr 96, pox 98% on ra
Pt is A&ox3-4. VSS. Pt stated he was rushing to go to the bathroom at time of ectopy. Patient denies any chest pain/discomfort.

## 2023-02-05 NOTE — PROGRESS NOTE ADULT - NS ATTEND OPT1 GEN_ALL_CORE

## 2023-02-05 NOTE — DISCHARGE NOTE NURSING/CASE MANAGEMENT/SOCIAL WORK - NSDCPEFALRISK_GEN_ALL_CORE
For information on Fall & Injury Prevention, visit: [email or web address] OR  [email or web address] OR  https://www.cdc.gov/steadi/patient.html

## 2023-02-06 ENCOUNTER — TRANSCRIPTION ENCOUNTER (OUTPATIENT)
Age: 78
End: 2023-02-06

## 2023-02-06 VITALS — WEIGHT: 250 LBS

## 2023-02-06 LAB
CULTURE RESULTS: SIGNIFICANT CHANGE UP
CULTURE RESULTS: SIGNIFICANT CHANGE UP
SARS-COV-2 RNA SPEC QL NAA+PROBE: SIGNIFICANT CHANGE UP
SPECIMEN SOURCE: SIGNIFICANT CHANGE UP
SPECIMEN SOURCE: SIGNIFICANT CHANGE UP

## 2023-02-06 PROCEDURE — 71045 X-RAY EXAM CHEST 1 VIEW: CPT

## 2023-02-06 PROCEDURE — 93325 DOPPLER ECHO COLOR FLOW MAPG: CPT

## 2023-02-06 PROCEDURE — 80048 BASIC METABOLIC PNL TOTAL CA: CPT

## 2023-02-06 PROCEDURE — 87641 MR-STAPH DNA AMP PROBE: CPT

## 2023-02-06 PROCEDURE — 94640 AIRWAY INHALATION TREATMENT: CPT

## 2023-02-06 PROCEDURE — 87150 DNA/RNA AMPLIFIED PROBE: CPT

## 2023-02-06 PROCEDURE — 85652 RBC SED RATE AUTOMATED: CPT

## 2023-02-06 PROCEDURE — 75557 CARDIAC MRI FOR MORPH: CPT

## 2023-02-06 PROCEDURE — 85027 COMPLETE CBC AUTOMATED: CPT

## 2023-02-06 PROCEDURE — 36569 INSJ PICC 5 YR+ W/O IMAGING: CPT

## 2023-02-06 PROCEDURE — 87086 URINE CULTURE/COLONY COUNT: CPT

## 2023-02-06 PROCEDURE — 80053 COMPREHEN METABOLIC PANEL: CPT

## 2023-02-06 PROCEDURE — 84145 PROCALCITONIN (PCT): CPT

## 2023-02-06 PROCEDURE — 0225U NFCT DS DNA&RNA 21 SARSCOV2: CPT

## 2023-02-06 PROCEDURE — 82962 GLUCOSE BLOOD TEST: CPT

## 2023-02-06 PROCEDURE — 97161 PT EVAL LOW COMPLEX 20 MIN: CPT

## 2023-02-06 PROCEDURE — 93970 EXTREMITY STUDY: CPT

## 2023-02-06 PROCEDURE — 84100 ASSAY OF PHOSPHORUS: CPT

## 2023-02-06 PROCEDURE — 84443 ASSAY THYROID STIM HORMONE: CPT

## 2023-02-06 PROCEDURE — 99285 EMERGENCY DEPT VISIT HI MDM: CPT | Mod: 25

## 2023-02-06 PROCEDURE — 83036 HEMOGLOBIN GLYCOSYLATED A1C: CPT

## 2023-02-06 PROCEDURE — 87186 SC STD MICRODIL/AGAR DIL: CPT

## 2023-02-06 PROCEDURE — 83735 ASSAY OF MAGNESIUM: CPT

## 2023-02-06 PROCEDURE — 93005 ELECTROCARDIOGRAM TRACING: CPT

## 2023-02-06 PROCEDURE — 71275 CT ANGIOGRAPHY CHEST: CPT | Mod: MA

## 2023-02-06 PROCEDURE — 85610 PROTHROMBIN TIME: CPT

## 2023-02-06 PROCEDURE — 85730 THROMBOPLASTIN TIME PARTIAL: CPT

## 2023-02-06 PROCEDURE — 87040 BLOOD CULTURE FOR BACTERIA: CPT

## 2023-02-06 PROCEDURE — 83880 ASSAY OF NATRIURETIC PEPTIDE: CPT

## 2023-02-06 PROCEDURE — 93306 TTE W/DOPPLER COMPLETE: CPT

## 2023-02-06 PROCEDURE — 86140 C-REACTIVE PROTEIN: CPT

## 2023-02-06 PROCEDURE — 84484 ASSAY OF TROPONIN QUANT: CPT

## 2023-02-06 PROCEDURE — C1751: CPT

## 2023-02-06 PROCEDURE — 81001 URINALYSIS AUTO W/SCOPE: CPT

## 2023-02-06 PROCEDURE — 85025 COMPLETE CBC W/AUTO DIFF WBC: CPT

## 2023-02-06 PROCEDURE — 74177 CT ABD & PELVIS W/CONTRAST: CPT | Mod: MA

## 2023-02-06 PROCEDURE — 87077 CULTURE AEROBIC IDENTIFY: CPT

## 2023-02-06 PROCEDURE — 96374 THER/PROPH/DIAG INJ IV PUSH: CPT

## 2023-02-06 PROCEDURE — 36415 COLL VENOUS BLD VENIPUNCTURE: CPT

## 2023-02-06 PROCEDURE — 87640 STAPH A DNA AMP PROBE: CPT

## 2023-02-06 PROCEDURE — 93320 DOPPLER ECHO COMPLETE: CPT

## 2023-02-06 PROCEDURE — 80061 LIPID PANEL: CPT

## 2023-02-06 PROCEDURE — 87449 NOS EACH ORGANISM AG IA: CPT

## 2023-02-06 PROCEDURE — 93312 ECHO TRANSESOPHAGEAL: CPT

## 2023-02-06 PROCEDURE — 87635 SARS-COV-2 COVID-19 AMP PRB: CPT

## 2023-02-06 RX ORDER — ASPIRIN/CALCIUM CARB/MAGNESIUM 324 MG
1 TABLET ORAL
Qty: 0 | Refills: 0 | DISCHARGE
Start: 2023-02-06

## 2023-02-06 RX ORDER — METOPROLOL TARTRATE 50 MG
1 TABLET ORAL
Qty: 0 | Refills: 0 | DISCHARGE
Start: 2023-02-06

## 2023-02-06 RX ORDER — LANOLIN ALCOHOL/MO/W.PET/CERES
1 CREAM (GRAM) TOPICAL
Qty: 0 | Refills: 0 | DISCHARGE
Start: 2023-02-06

## 2023-02-06 RX ADMIN — BUDESONIDE AND FORMOTEROL FUMARATE DIHYDRATE 2 PUFF(S): 160; 4.5 AEROSOL RESPIRATORY (INHALATION) at 05:31

## 2023-02-06 RX ADMIN — PANTOPRAZOLE SODIUM 40 MILLIGRAM(S): 20 TABLET, DELAYED RELEASE ORAL at 06:58

## 2023-02-06 RX ADMIN — Medication 81 MILLIGRAM(S): at 12:03

## 2023-02-06 RX ADMIN — Medication 50 MILLIGRAM(S): at 17:07

## 2023-02-06 RX ADMIN — MONTELUKAST 10 MILLIGRAM(S): 4 TABLET, CHEWABLE ORAL at 12:09

## 2023-02-06 RX ADMIN — CHLORHEXIDINE GLUCONATE 1 APPLICATION(S): 213 SOLUTION TOPICAL at 05:41

## 2023-02-06 RX ADMIN — CEFTRIAXONE 100 MILLIGRAM(S): 500 INJECTION, POWDER, FOR SOLUTION INTRAMUSCULAR; INTRAVENOUS at 13:11

## 2023-02-06 RX ADMIN — Medication 2: at 09:06

## 2023-02-06 RX ADMIN — LOSARTAN POTASSIUM 100 MILLIGRAM(S): 100 TABLET, FILM COATED ORAL at 05:31

## 2023-02-06 RX ADMIN — BUDESONIDE AND FORMOTEROL FUMARATE DIHYDRATE 2 PUFF(S): 160; 4.5 AEROSOL RESPIRATORY (INHALATION) at 17:09

## 2023-02-06 RX ADMIN — Medication 50 MILLIGRAM(S): at 05:31

## 2023-02-06 RX ADMIN — HEPARIN SODIUM 5000 UNIT(S): 5000 INJECTION INTRAVENOUS; SUBCUTANEOUS at 13:11

## 2023-02-06 RX ADMIN — HEPARIN SODIUM 5000 UNIT(S): 5000 INJECTION INTRAVENOUS; SUBCUTANEOUS at 05:31

## 2023-02-06 NOTE — PHYSICAL THERAPY INITIAL EVALUATION ADULT - LIVES WITH, PROFILE
and 2 adult children; in apartment with 1 step to enter, no other steps to enter from garage, lives on first floor, Independent at baseline/spouse

## 2023-02-06 NOTE — DIETITIAN INITIAL EVALUATION ADULT - NS FNS DIET ORDER
Diet, Regular:   Consistent Carbohydrate {Evening Snack} (CSTCHOSN)  DASH/TLC {Sodium & Cholesterol Restricted} (DASH)  Bijan (01-29-23 @ 21:19) [Active]

## 2023-02-06 NOTE — DISCHARGE NOTE PROVIDER - NSDCFUSCHEDAPPT_GEN_ALL_CORE_FT
[facility]  [address]  Scheduled Appointment: 02/08/2023

## 2023-02-06 NOTE — DISCHARGE NOTE PROVIDER - CARE PROVIDER_API CALL
T[facility]ne [facility]illo)  Orthopaedic Surgery  [address], [address]  [place] [address]  Phone: [phone]  Fax: [phone]  Established Patient  Follow Up Time: 1 week   Tremayne Murillo)  Orthopaedic Surgery  [address], [address]  [place] [address]  Phone: [phone]  Fax: [phone]  Established Patient  Follow Up Time: 1 week    Dagmar Steward)  Internal Medicine  [address], [place] [address]  Phone: [phone]  Fax: [phone]  Established Patient  Follow Up Time: 1 month

## 2023-02-06 NOTE — DIETITIAN INITIAL EVALUATION ADULT - PERTINENT MEDS FT
MEDICATIONS  (STANDING):  ALPRAZolam 0.25 milliGRAM(s) Oral once  aspirin enteric coated 81 milliGRAM(s) Oral daily  atorvastatin 80 milliGRAM(s) Oral at bedtime  budesonide 160 MICROgram(s)/formoterol 4.5 MICROgram(s) Inhaler 2 Puff(s) Inhalation two times a day  cefTRIAXone   IVPB 2000 milliGRAM(s) IV Intermittent every 24 hours  chlorhexidine 4% Liquid 1 Application(s) Topical <User Schedule>  dextroamphetamine ER capsule 10 milliGRAM(s) 10 milliGRAM(s) Oral daily  dextrose 5%. 1000 milliLiter(s) (50 mL/Hr) IV Continuous <Continuous>  dextrose 5%. 1000 milliLiter(s) (100 mL/Hr) IV Continuous <Continuous>  dextrose 50% Injectable 25 Gram(s) IV Push once  dextrose 50% Injectable 12.5 Gram(s) IV Push once  donepezil 5 milliGRAM(s) Oral at bedtime  Gemtesa 75mg Tablet 1 Tablet(s) 1 Tablet(s) Oral daily  glucagon  Injectable 1 milliGRAM(s) IntraMuscular once  heparin   Injectable 5000 Unit(s) SubCutaneous every 8 hours  insulin glargine Injectable (LANTUS) 16 Unit(s) SubCutaneous at bedtime  insulin lispro (ADMELOG) corrective regimen sliding scale   SubCutaneous three times a day before meals  insulin lispro (ADMELOG) corrective regimen sliding scale   SubCutaneous at bedtime  losartan 100 milliGRAM(s) Oral daily  metoprolol tartrate 50 milliGRAM(s) Oral two times a day  montelukast 10 milliGRAM(s) Oral daily  pantoprazole    Tablet 40 milliGRAM(s) Oral before breakfast  senna 2 Tablet(s) Oral at bedtime    MEDICATIONS  (PRN):  acetaminophen     Tablet .. 650 milliGRAM(s) Oral every 6 hours PRN Temp greater or equal to 38C (100.4F), Mild Pain (1 - 3)  aluminum hydroxide/magnesium hydroxide/simethicone Suspension 30 milliLiter(s) Oral every 4 hours PRN Dyspepsia  dextrose Oral Gel 15 Gram(s) Oral once PRN Blood Glucose LESS THAN 70 milliGRAM(s)/deciliter  melatonin 3 milliGRAM(s) Oral at bedtime PRN Insomnia  ondansetron Injectable 4 milliGRAM(s) IV Push every 8 hours PRN Nausea and/or Vomiting  polyethylene glycol 3350 17 Gram(s) Oral daily PRN Constipation  sodium chloride 0.9% lock flush 10 milliLiter(s) IV Push every 1 hour PRN Pre/post blood products, medications, blood draw, and to maintain line patency

## 2023-02-06 NOTE — PROGRESS NOTE ADULT - SUBJECTIVE AND OBJECTIVE BOX
EP ATTENDING    tele: NSR, no events, no AF    he denies palpitations, syncope, nor angina, ROS otherwise -      DATE OF SERVICE - 02-06-23     Review of Systems:   Constitutional: [ ] fevers, [ ] chills.   Skin: [ ] dry skin. [ ] rashes.  Psychiatric: [ ] depression, [ ] anxiety.   Gastrointestinal: [ ] BRBPR, [ ] melena.   Neurological: [ ] confusion. [ ] seizures. [ ] shuffling gait.   Ears,Nose,Mouth and Throat: [ ] ear pain [ ] sore throat.   Eyes: [ ] diplopia.   Respiratory: [ ] hemoptysis. [ ] shortness of breath  Cardiovascular: See HPI above  Hematologic/Lymphatic: [ ] anemia. [ ] painful nodes. [ ] prolonged bleeding.   Genitourinary: [ ] hematuria. [ ] flank pain.   Endocrine: [ ] significant change in weight. [ ] intolerance to heat and cold.     Review of systems [ x] otherwise negative, [ ] otherwise unable to obtain    FH: no family history of sudden cardiac death in first degree relatives    SH: [ ] tobacco, [ ] alcohol, [ ] drugs    acetaminophen     Tablet .. 650 milliGRAM(s) Oral every 6 hours PRN  ALPRAZolam 0.25 milliGRAM(s) Oral once  aluminum hydroxide/magnesium hydroxide/simethicone Suspension 30 milliLiter(s) Oral every 4 hours PRN  aspirin enteric coated 81 milliGRAM(s) Oral daily  atorvastatin 80 milliGRAM(s) Oral at bedtime  budesonide 160 MICROgram(s)/formoterol 4.5 MICROgram(s) Inhaler 2 Puff(s) Inhalation two times a day  cefTRIAXone   IVPB 2000 milliGRAM(s) IV Intermittent every 24 hours  chlorhexidine 4% Liquid 1 Application(s) Topical <User Schedule>  dextroamphetamine ER capsule 10 milliGRAM(s) 10 milliGRAM(s) Oral daily  dextrose 5%. 1000 milliLiter(s) IV Continuous <Continuous>  dextrose 5%. 1000 milliLiter(s) IV Continuous <Continuous>  dextrose 50% Injectable 25 Gram(s) IV Push once  dextrose 50% Injectable 12.5 Gram(s) IV Push once  dextrose Oral Gel 15 Gram(s) Oral once PRN  donepezil 5 milliGRAM(s) Oral at bedtime  Gemtesa 75mg Tablet 1 Tablet(s) 1 Tablet(s) Oral daily  glucagon  Injectable 1 milliGRAM(s) IntraMuscular once  heparin   Injectable 5000 Unit(s) SubCutaneous every 8 hours  insulin glargine Injectable (LANTUS) 16 Unit(s) SubCutaneous at bedtime  insulin lispro (ADMELOG) corrective regimen sliding scale   SubCutaneous three times a day before meals  insulin lispro (ADMELOG) corrective regimen sliding scale   SubCutaneous at bedtime  losartan 100 milliGRAM(s) Oral daily  melatonin 3 milliGRAM(s) Oral at bedtime PRN  metoprolol tartrate 50 milliGRAM(s) Oral two times a day  montelukast 10 milliGRAM(s) Oral daily  ondansetron Injectable 4 milliGRAM(s) IV Push every 8 hours PRN  pantoprazole    Tablet 40 milliGRAM(s) Oral before breakfast  polyethylene glycol 3350 17 Gram(s) Oral daily PRN  senna 2 Tablet(s) Oral at bedtime  sodium chloride 0.9% lock flush 10 milliLiter(s) IV Push every 1 hour PRN  tadalafil 5 milliGRAM(s) Oral daily                        T(C): 36.3 (02-06-23 @ 04:32), Max: 37.1 (02-05-23 @ 21:02)  HR: 98 (02-06-23 @ 09:32) (69 - 98)  BP: 109/67 (02-06-23 @ 09:32) (109/67 - 146/79)  RR: 18 (02-06-23 @ 04:32) (18 - 18)  SpO2: 100% (02-06-23 @ 09:32) (96% - 100%)  Wt(kg): --    I&O's Summary    05 Feb 2023 07:01  -  06 Feb 2023 07:00  --------------------------------------------------------  IN: 120 mL / OUT: 0 mL / NET: 120 mL        General: Well nourished in no acute distress. Alert and Oriented * 3.   Head: Normocephalic and atraumatic.   Neck: No JVD. No bruits. Supple. Does not appear to be enlarged.   Cardiovascular: + S1,S2 ; RRR Soft systolic murmur at the left lower sternal border. No rubs noted.    Lungs: CTA b/l. No rhonchi, rales or wheezes.   Abdomen: + BS, soft. Non tender. Non distended. No rebound. No guarding.   Extremities: No clubbing/cyanosis/edema.   Neurologic: Moves all four extremities. Full range of motion.   Skin: Warm and moist. The patient's skin has normal elasticity and good skin turgor.   Psychiatric: Appropriate mood and affect.  Musculoskeletal: Normal range of motion, normal strength      HAIM: moderate AS, moderate AI, no endocarditis    A/P) 78 y/o male PMH hypertension, hyperlipidemia, diabetes, and asthma a/w gram positive bacteremia. HAIM did not show endocarditis. EP called for bursts of PAT on telemetry resulting in palpitations. There has never been documented AF.     -continue lipitor for hyperlipidemia  -continue losartan for hypertension  -continue metoprolol for PAT  -no indication for systemic anticoagulation as he's never had documented AF  -continue aspirin for the primary prevention of stroke or MI  -recommend outpatient event monitoring screening for AF  -f/u ID        Maricruz Juárez M.D., [facility]  Cardiac Electrophysiology  [facility]  [address] [place] [address], [address] [place] [address]  [email or web address]    office [phone]  pager [phone]

## 2023-02-06 NOTE — DISCHARGE NOTE PROVIDER - CARE PROVIDERS DIRECT ADDRESSES
,DirectAddress_Unknown ,DirectAddress_Unknown,[email or web address]

## 2023-02-06 NOTE — DIETITIAN INITIAL EVALUATION ADULT - DIET TYPE
KOSHER/DASH/TLC (sodium and cholesterol restricted diet)/60 gm protein/consistent carbohydrate (evening snack)

## 2023-02-06 NOTE — DISCHARGE NOTE PROVIDER - NSDCCPCAREPLAN_GEN_ALL_CORE_FT
PRINCIPAL DISCHARGE DIAGNOSIS  Diagnosis: Suspected endocarditis  Assessment and Plan of Treatment: Continue present medication regimen.   Weekly CBCm, BMP  Follow up with PMD in 1 -3 days at the rehab.      SECONDARY DISCHARGE DIAGNOSES  Diagnosis: NSTEMI (non-ST elevated myocardial infarction)  Assessment and Plan of Treatment: Call your doctor if you have unusual chest pain, pressure, or discomfort, shortness of breath, nausea, vomiting, burping, heartburn, tingling upper body parts, sweating, cold, clammy sking, racing heartbeat  Call 911 if you think you are having a heart attack  Take all cardiac medications as prescribed - notify your doctor if you have any side effects  Follow cardiac diet - avoid fatty & fried foods, don't eat too much red meat, eat lots of fruits & vegetables, dairy products should be low fat  Lose weight if you are overweight  Become more active with walking, gardening, or any other activity that gets you to move       PRINCIPAL DISCHARGE DIAGNOSIS  Diagnosis: Suspected endocarditis  Assessment and Plan of Treatment: Continue present medication regimen.   Continue IV Ceftriaxone until last dose on 3/1/23.   Weekly CBC, CMP  Report labs to Dr. Steward Infectious Disease.   Follow up with PMD in 1 -3 days at the rehab.      SECONDARY DISCHARGE DIAGNOSES  Diagnosis: NSTEMI (non-ST elevated myocardial infarction)  Assessment and Plan of Treatment: Call your doctor if you have unusual chest pain, pressure, or discomfort, shortness of breath, nausea, vomiting, burping, heartburn, tingling upper body parts, sweating, cold, clammy sking, racing heartbeat  Call 911 if you think you are having a heart attack  Take all cardiac medications as prescribed - notify your doctor if you have any side effects  Follow cardiac diet - avoid fatty & fried foods, don't eat too much red meat, eat lots of fruits & vegetables, dairy products should be low fat  Lose weight if you are overweight  Become more active with walking, gardening, or any other activity that gets you to move

## 2023-02-06 NOTE — PROGRESS NOTE ADULT - SUBJECTIVE AND OBJECTIVE BOX
DATE OF SERVICE: 02-06-23 @ 21:13    Patient is a 77y old  Male who presents with a chief complaint of sob x few weeks (06 Feb 2023 14:51)      INTERVAL HISTORY: feels ok     MEDICATIONS:  losartan 100 milliGRAM(s) Oral daily  metoprolol tartrate 50 milliGRAM(s) Oral two times a day        PHYSICAL EXAM:  T(C): 36.3 (02-06-23 @ 11:13), Max: 36.3 (02-06-23 @ 04:32)  HR: 76 (02-06-23 @ 11:13) (76 - 98)  BP: 114/76 (02-06-23 @ 11:13) (109/67 - 130/79)  RR: 18 (02-06-23 @ 11:13) (18 - 18)  SpO2: 98% (02-06-23 @ 11:13) (96% - 100%)  Wt(kg): --  I&O's Summary    05 Feb 2023 07:01  -  06 Feb 2023 07:00  --------------------------------------------------------  IN: 120 mL / OUT: 0 mL / NET: 120 mL          Appearance: In no distress	  HEENT:    PERRL, EOMI	  Cardiovascular:  S1 S2, No JVD  Respiratory: Lungs clear to auscultation	  Gastrointestinal:  Soft, Non-tender, + BS	  Vascularature:  No edema of LE  Psychiatric: Appropriate affect   Neuro: no acute focal deficits           Labs personally reviewed      ASSESSMENT/PLAN: 	      Patient is a 77 year old male w/pmh  of HTN, HLD, DM, asthma , memory impairment presenting for  shortness or breath for the past few weeks. He reports no chest pain, LE edema, fever, chills, N/V/D but does have palpitations.  Followed by OP Cardiologist, Dr. Siu.      Problem/Plan -1  Problem: Shortness of Breath  - ECG NSR, RBBB (no previous ECG available to compare)  - Trop nondiagnostic 57 --> 65  - CRP elevated  - CT neg for PE (heart size normal, no pericardial effusion)  - CXR with clear lungs  - pro BNP 93  - TTE shows EF53%, moderate AS, concentric LV remodeling, no WMA, mild DD, normal RV  - c/w ASA 81mg PO and Atorvastatin 80mg PO daily  - given very elevated ESR, FUO and SOB will consider r/o endocarditis   - Follow up repeat Blood Cx. HAIM no vegetation  - given typical organism, will treat for endocarditis per ID, s/p PICC 2/5 for long term abx    Problem/Plan -2  Problem:  Hypertension  - c/w Losartan 100mg PO daily  - c/w Metoprolol     Problem/Plan -3  Problem: Hyperlipidemia  - LDL 69  - c/w Atorvastatin 80mg PO daily    Problem/Plan -4  Problem: PAT  - Now with increased PAC/PVC and 15 sec PAT up to the 170s  - Will increase Metoprolol to 50mg PO BID          Osmani Candelaria DO [facility]  Cardiovascular Medicine  [address] [place] [address], [address] [facility]  Office: [phone]  Available via Text/call on Microsoft Teams

## 2023-02-06 NOTE — DIETITIAN INITIAL EVALUATION ADULT - ORAL INTAKE PTA/DIET HISTORY
light breakfast with meds, protein and salad for lunch, protein and ras slaw for dinner. snacks on crackers and cakes.

## 2023-02-06 NOTE — PROGRESS NOTE ADULT - REASON FOR ADMISSION
sob x few weeks

## 2023-02-06 NOTE — PROGRESS NOTE ADULT - PROVIDER SPECIALTY LIST ADULT
Cardiology
Electrophysiology
Internal Medicine
Cardiology
Electrophysiology
Infectious Disease
Internal Medicine
Internal Medicine
Cardiology
Infectious Disease
Internal Medicine
Infectious Disease
Internal Medicine

## 2023-02-06 NOTE — DISCHARGE NOTE PROVIDER - NSDCFUADDAPPT_GEN_ALL_CORE_FT
Follow up with ID in 4 weeks (after March 1, 2023).  Can report weekly labs (CBC and CMP) to Dr. Steward.      Schedule repeat cultures and echo after completion of IV antibiotics on March 1, 2023.

## 2023-02-06 NOTE — DISCHARGE NOTE PROVIDER - PROVIDER TOKENS
PROVIDER:[TOKEN:[[id number]],FOLLOWUP:[1 week],ESTABLISHEDPATIENT:[T]] PROVIDER:[TOKEN:[[id number]],FOLLOWUP:[1 week],ESTABLISHEDPATIENT:[T]],PROVIDER:[TOKEN:[[id number]],FOLLOWUP:[1 month],ESTABLISHEDPATIENT:[T]]

## 2023-02-06 NOTE — DISCHARGE NOTE PROVIDER - NSDCMRMEDTOKEN_GEN_ALL_CORE_FT
Advair  mcg-21 mcg/inh inhalation aerosol: 2 puff(s) inhaled 2 times a day  aspirin 81 mg oral delayed release tablet: 1 tab(s) orally once a day  CBC, CMP Weekly : Weekly labs : CBC, CMP  Please send results to Dr Steward  [phone]  cefTRIAXone 2 g injection: 2 gram(s) intravenously once a day via PICC  through 3/1/23  Dx: R78.81  Colace:   dextroamphetamine 10 mg oral capsule, extended release: 1 cap(s) orally once a day (in the morning)  donepezil 5 mg oral tablet: 1 tab(s) orally once a day (at bedtime)  Invokamet 50 mg-1000 mg oral tablet: 1 tab(s) orally 2 times a day (with meals)  losartan 100 mg oral tablet: 1 tab(s) orally once a day  melatonin 3 mg oral tablet: 1 tab(s) orally once a day (at bedtime), As needed, Insomnia  metoprolol tartrate 50 mg oral tablet: 1 tab(s) orally 2 times a day  montelukast 10 mg oral tablet: 1 tab(s) orally once a day  pantoprazole 20 mg oral delayed release tablet: 1 tab(s) orally once a day  rosuvastatin 20 mg oral tablet: 1 tab(s) orally once a day  tadalafil 5 mg oral tablet: 1 tab(s) orally once a day  Tresiba FlexTouch 100 units/mL subcutaneous solution: 20 unit(s) subcutaneous once a day (at bedtime)  vibegron 75 mg oral tablet: 1 tab(s) orally once a day

## 2023-02-06 NOTE — PROGRESS NOTE ADULT - ASSESSMENT
77 M  w/pmh  of HTN, HLD, DM, asthma , memory impairment presenting for  shortness or breath for the past few weeks.      Elevated troponin  - ? Type II nstemi in setting of increased stress and influenza infection, however high risk for cardiac disease  Vs concern for endocarditis   - Monitor on telemetry   - trend cardiac enzymes   - Cardiology consult appreciated; F/u recs   - continue asa   - statin.    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 Monitor lytes for K>4 and Mg > 2    - Check Cardiac MR, Check LE duplex, noted   - Monitor on tele    Dyspnea/ Shortness or breath  - Bnp wnl , chest imaging w/o PE , no lung pathology noted  , symptoms can be 2/2 deconditioning Vs. anginal equivalent    - TTE as noted; F/u HAIM  -- as noted; F/u recs   - Monitor O2 saturation closely   - Episode of PAT 01/31-02/01 overnight. Now on metoprolol 50 BID     Diabetes mellitus.   - Reduced home dose while inpatient , can up titrate as tolerates  - Lantus 16 units QHs  and sliding scale  - Hold oral hypoglycemics  - check fsg qac/qhs  - check a1c -- 7.0  - consistent carb diet.    Asthma.   - Symbicort   - montelukast   - Duoneb PRN.    HTN (hypertension).   - continue losartan.    HLD  - Statin   - LDL of 69    ADHD.   - Dextroamphetamine( non formulary , patients own meds).    BPH (benign prostatic hyperplasia).   - Tadalafil ( patients own meds )   - Vibegon ( patients own meds).      PPX  - Heparin 5K Q 8   - PPI       D/C Planing underway.

## 2023-02-06 NOTE — PROGRESS NOTE ADULT - SUBJECTIVE AND OBJECTIVE BOX
Name of Patient : MARY JANE CORONADO  MRN: [id number]  Date of visit: 02-06-23 @ 12:39      Subjective: Patient seen and examined. No new events except as noted.   Patient seen earlier this AM. Sitting OOB TC    REVIEW OF SYSTEMS:    CONSTITUTIONAL: No weakness, fevers or chills  EYES/ENT: No visual changes;  No vertigo or throat pain   NECK: No pain or stiffness  RESPIRATORY: No cough, wheezing, hemoptysis; No shortness of breath  CARDIOVASCULAR: No chest pain or palpitations  GASTROINTESTINAL: No abdominal or epigastric pain. No nausea, vomiting, or hematemesis; No diarrhea or constipation. No melena or hematochezia.  GENITOURINARY: No dysuria, frequency or hematuria  NEUROLOGICAL: No numbness or weakness  SKIN: No itching, burning, rashes, or lesions   All other review of systems is negative unless indicated above.    MEDICATIONS:  MEDICATIONS  (STANDING):  ALPRAZolam 0.25 milliGRAM(s) Oral once  aspirin enteric coated 81 milliGRAM(s) Oral daily  atorvastatin 80 milliGRAM(s) Oral at bedtime  budesonide 160 MICROgram(s)/formoterol 4.5 MICROgram(s) Inhaler 2 Puff(s) Inhalation two times a day  cefTRIAXone   IVPB 2000 milliGRAM(s) IV Intermittent every 24 hours  chlorhexidine 4% Liquid 1 Application(s) Topical <User Schedule>  dextroamphetamine ER capsule 10 milliGRAM(s) 10 milliGRAM(s) Oral daily  dextrose 5%. 1000 milliLiter(s) (50 mL/Hr) IV Continuous <Continuous>  dextrose 5%. 1000 milliLiter(s) (100 mL/Hr) IV Continuous <Continuous>  dextrose 50% Injectable 25 Gram(s) IV Push once  dextrose 50% Injectable 12.5 Gram(s) IV Push once  donepezil 5 milliGRAM(s) Oral at bedtime  Gemtesa 75mg Tablet 1 Tablet(s) 1 Tablet(s) Oral daily  glucagon  Injectable 1 milliGRAM(s) IntraMuscular once  heparin   Injectable 5000 Unit(s) SubCutaneous every 8 hours  insulin glargine Injectable (LANTUS) 16 Unit(s) SubCutaneous at bedtime  insulin lispro (ADMELOG) corrective regimen sliding scale   SubCutaneous three times a day before meals  insulin lispro (ADMELOG) corrective regimen sliding scale   SubCutaneous at bedtime  losartan 100 milliGRAM(s) Oral daily  metoprolol tartrate 50 milliGRAM(s) Oral two times a day  montelukast 10 milliGRAM(s) Oral daily  pantoprazole    Tablet 40 milliGRAM(s) Oral before breakfast  senna 2 Tablet(s) Oral at bedtime      PHYSICAL EXAM:  T(C): 36.3 (02-06-23 @ 11:13), Max: 37.1 (02-05-23 @ 21:02)  HR: 76 (02-06-23 @ 11:13) (69 - 98)  BP: 114/76 (02-06-23 @ 11:13) (109/67 - 146/79)  RR: 18 (02-06-23 @ 11:13) (18 - 18)  SpO2: 98% (02-06-23 @ 11:13) (96% - 100%)  Wt(kg): --  I&O's Summary    05 Feb 2023 07:01  -  06 Feb 2023 07:00  --------------------------------------------------------  IN: 120 mL / OUT: 0 mL / NET: 120 mL          Appearance: Normal; Sitting OOB TC	  HEENT: Eyes are open   Lymphatic: No lymphadenopathy   Cardiovascular: Normal S1 S2, no JVD  Respiratory: normal effort , clear  Gastrointestinal:  Soft, Non-tender  Skin: No rashes,  warm to touch  Psychiatry:  Mood & affect appropriate  Musculoskeletal: No edema; Right UE PICC line in place       02-05-23 @ 07:01  -  02-06-23 @ 07:00  --------------------------------------------------------  IN: 120 mL / OUT: 0 mL / NET: 120 mL        Culture - Blood (02.01.23 @ 10:42)   Specimen Source: .Blood Blood   Culture Results: No growth to date.     Culture - Blood (02.01.23 @ 10:42)   Specimen Source: .Blood Blood   Culture Results: No growth to date.       Culture - Blood (01.31.23 @ 09:44)   Gram Stain: Growth in aerobic bottle: Gram Positive Cocci in Pairs and Chains   Specimen Source: .Blood Blood-Peripheral   Culture Results: Growth in aerobic bottle: Gram Positive Cocci in Pairs and Chains     Culture - Blood (01.31.23 @ 07:02)   Gram Stain: Growth in aerobic bottle: Gram positive cocci in pairs   Growth in anaerobic bottle: Gram positive cocci in pairs   Specimen Source: .Blood Blood-Peripheral   Culture Results: Growth in aerobic bottle: Gram positive cocci in pairs   Growth in anaerobic bottle: Streptococcus mutans   "Susceptibilities not performed"       < from: [facility] Duplex Lower Ext Vein Scan, Bilat (02.03.23 @ 12:13) >  IMPRESSION:  No evidence of deep venous thrombosis in either lower extremity.      < end of copied text >

## 2023-02-06 NOTE — DISCHARGE NOTE PROVIDER - HOSPITAL COURSE
To be done. 77 M  w/pmh  of HTN, HLD, DM, asthma , memory impairment presenting for  shortness or breath for the past few weeks.      Elevated troponin  - ? Type II nstemi in setting of increased stress and influenza infection, however high risk for cardiac disease  Vs concern for endocarditis   - Pt was on telemetry.   - continue asa   - statin.    Fever  - Blood cultures +, concern for endocarditis, RVP neg   , UA / UCx neg , CXR clear ,CTA chest/ AP without infectious findings   - F/u BCx2 01/29 w/ gm +, Strep mutans, alpha hemolytic strep  - Repeat BCx2 01/30 Gram + ; Strep mutans   - F/u Repeat Cx 01/31 -- gram +; Strep mutans   - F/u Repeat Cx 02/01 -- NGTD; F/u final   - TTE with EF of 53%, mild MR, Mod AS, Mild AR, no Seg WMA  - Elevated ESR and CRP - trend   - C/w ABX as per ID, ID eval appreciated -- On Rocephin 2g Qd til 3/1/23.  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 anginal equivalent    - TTE as noted; F/u HAIM  -- as noted; F/u recs   - Monitor O2 saturation closely   - Episode of PAT 01/31-02/01 overnight. Now on metoprolol 50 BID     Diabetes mellitus.   - Reduced home dose while inpatient , can up titrate as tolerates  - Lantus 16 units QHs  and sliding scale  - Hold oral hypoglycemics  - check fsg qac/qhs  - check a1c -- 7.0  - consistent carb diet.    Asthma.   - Symbicort   - montelukast   - Duoneb PRN.    HTN (hypertension).   - continue losartan.    HLD  - Statin   - LDL of 69    ADHD.   - Dextroamphetamine( non formulary , patients own meds).    BPH (benign prostatic hyperplasia).   - Tadalafil ( patients own meds )   - Vibegon ( patients own meds).    Pt will be discharged to [facility] today 2/6/23. Pt is hemodynacially stable for discharge today.

## 2023-02-06 NOTE — PHYSICAL THERAPY INITIAL EVALUATION ADULT - PERTINENT HX OF CURRENT PROBLEM, REHAB EVAL
77 M  w/pmh  of HTN, HLD, DM, asthma , memory impairment presenting for  shortness or breath for the past few weeks.    BLE doppler: (-) for DVT  MRI: Cardiac:  Concentric thickening of the left ventricular myocardium.  2.  The ejection fraction measures 65.31%  3.  Aortic regurgitation and aortic stenosis, not quantified.  CT abdomen: No pulmonary emboli visualized. No source of infection seen. No acute intrathoracic or abdominal pathology visualized.

## 2023-02-06 NOTE — DISCHARGE NOTE PROVIDER - NSDCHHPTASSISTHOME_GEN_ALL_CORE
Patient Needs Assistance to Leave Residence... conducted a detailed discussion... I had a detailed discussion with the patient and/or guardian regarding the historical points, exam findings, and any diagnostic results supporting the discharge/admit diagnosis.

## 2023-02-06 NOTE — DIETITIAN INITIAL EVALUATION ADULT - PERTINENT LABORATORY DATA
POCT Blood Glucose.: 142 mg/dL (02-06-23 @ 12:33)  A1C with Estimated Average Glucose Result: 7.0 % (01-30-23 @ 06:58)

## 2023-02-07 LAB
CULTURE RESULTS: SIGNIFICANT CHANGE UP
CULTURE RESULTS: SIGNIFICANT CHANGE UP
SPECIMEN SOURCE: SIGNIFICANT CHANGE UP
SPECIMEN SOURCE: SIGNIFICANT CHANGE UP

## 2023-02-08 ENCOUNTER — APPOINTMENT (OUTPATIENT)
Dept: ORTHOPEDIC SURGERY | Facility: CLINIC | Age: 78
End: 2023-02-08

## 2023-02-23 PROBLEM — I10 ESSENTIAL (PRIMARY) HYPERTENSION: Chronic | Status: ACTIVE | Noted: 2023-01-29

## 2023-02-23 PROBLEM — J45.909 UNSPECIFIED ASTHMA, UNCOMPLICATED: Chronic | Status: ACTIVE | Noted: 2023-01-29

## 2023-02-23 PROBLEM — F90.9 ATTENTION-DEFICIT HYPERACTIVITY DISORDER, UNSPECIFIED TYPE: Chronic | Status: ACTIVE | Noted: 2023-01-29

## 2023-02-23 PROBLEM — E11.9 TYPE 2 DIABETES MELLITUS WITHOUT COMPLICATIONS: Chronic | Status: ACTIVE | Noted: 2023-01-29

## 2023-02-23 PROBLEM — E78.5 HYPERLIPIDEMIA, UNSPECIFIED: Chronic | Status: ACTIVE | Noted: 2023-01-29

## 2023-03-01 ENCOUNTER — NON-APPOINTMENT (OUTPATIENT)
Age: 78
End: 2023-03-01

## 2023-03-01 ENCOUNTER — TRANSCRIPTION ENCOUNTER (OUTPATIENT)
Age: 78
End: 2023-03-01

## 2023-03-03 ENCOUNTER — NON-APPOINTMENT (OUTPATIENT)
Age: 78
End: 2023-03-03

## 2023-03-09 ENCOUNTER — APPOINTMENT (OUTPATIENT)
Dept: INFECTIOUS DISEASE | Facility: CLINIC | Age: 78
End: 2023-03-09
Payer: MEDICARE

## 2023-03-09 VITALS
HEIGHT: 71 IN | DIASTOLIC BLOOD PRESSURE: 92 MMHG | SYSTOLIC BLOOD PRESSURE: 164 MMHG | HEART RATE: 76 BPM | OXYGEN SATURATION: 99 % | TEMPERATURE: 97.6 F

## 2023-03-09 DIAGNOSIS — R93.1 ABNORMAL FINDINGS ON DIAGNOSTIC IMAGING OF HEART AND CORONARY CIRCULATION: ICD-10-CM

## 2023-03-09 PROCEDURE — 99214 OFFICE O/P EST MOD 30 MIN: CPT

## 2023-03-09 NOTE — HISTORY OF PRESENT ILLNESS
[FreeTextEntry1] : 77 m with DM, HTN, HLD, asthma , memory impairment, was admitted 1/29/23 for fever, strep mutans bacteremia\par chest/abd CTA no source of infection\par TTE: no reported vegetation and HAIM read as A small, calcified, mobile mass is attached to the aortic valve leaflets on the aortic side of the valve, and is most consistent with a Lambl's excresence. It is not consistent with endocarditis\par pt was seen by dental and no active source of infection identified but had cleaning 3 months prior\par although HAIM stated no evidence of endocarditis pt was discharged with a 4 week course of ceftriaxone which completed now here for f/u and asymptomatic

## 2023-03-09 NOTE — ASSESSMENT
[FreeTextEntry1] : 77 m with DM, HTN, HLD, asthma , memory impairment, was admitted 1/29/23 for fever, strep mutans bacteremia\par chest/abd CTA no source of infection\par TTE: no reported vegetation and HAIM read as A small, calcified, mobile mass is attached to the aortic valve leaflets on the aortic side of the valve, and is most consistent with a Lambl's excresence. It is not consistent with endocarditis\par pt was seen by dental and no active source of infection identified but had cleaning 3 months prior\par although HAIM stated no evidence of endocarditis pt was discharged with a 4 week course of ceftriaxone which completed now here for f/u and asymptomatic\par \par \par * pt completed the 4 week course over a week ago\par * repeat CBC, CMP, ESR, CRP, blood cultures\par * f/u with cardio and repeat echo\par

## 2023-03-09 NOTE — REVIEW OF SYSTEMS
[Fever] : no fever [Chills] : no chills [Eye Pain] : no eye pain [Red Eyes] : eyes not red [Earache] : no earache [Loss Of Hearing] : no hearing loss [Chest Pain] : no chest pain [Palpitations] : no palpitations [Shortness Of Breath] : no shortness of breath [Wheezing] : no wheezing [Cough] : no cough [Abdominal Pain] : no abdominal pain [Vomiting] : no vomiting [Dysuria] : no dysuria [Joint Swelling] : no joint swelling [Skin Wound] : no skin wound [Confused] : no confusion [Convulsions] : no convulsions [Suicidal] : not suicidal [Easy Bleeding] : no tendency for easy bleeding [Easy Bruising] : no tendency for easy bruising [Negative] : Heme/Lymph

## 2023-03-09 NOTE — PHYSICAL EXAM
[General Appearance - Alert] : alert [General Appearance - In No Acute Distress] : in no acute distress [Sclera] : the sclera and conjunctiva were normal [Outer Ear] : the ears and nose were normal in appearance [Neck Appearance] : the appearance of the neck was normal [Auscultation Breath Sounds / Voice Sounds] : lungs were clear to auscultation bilaterally [Edema] : there was no peripheral edema [Bowel Sounds] : normal bowel sounds [Abdomen Soft] : soft [Abdomen Tenderness] : non-tender [Costovertebral Angle Tenderness] : no CVA tenderness [No Palpable Adenopathy] : no palpable adenopathy [Musculoskeletal - Swelling] : no joint swelling [] : no rash [No Focal Deficits] : no focal deficits [Affect] : the affect was normal

## 2023-03-10 LAB
ALBUMIN SERPL ELPH-MCNC: 4.4 G/DL
ALP BLD-CCNC: 55 U/L
ALT SERPL-CCNC: 14 U/L
ANION GAP SERPL CALC-SCNC: 13 MMOL/L
AST SERPL-CCNC: 14 U/L
BASOPHILS # BLD AUTO: 0.06 K/UL
BASOPHILS NFR BLD AUTO: 0.8 %
BILIRUB SERPL-MCNC: 0.6 MG/DL
BUN SERPL-MCNC: 11 MG/DL
CALCIUM SERPL-MCNC: 9.9 MG/DL
CHLORIDE SERPL-SCNC: 100 MMOL/L
CO2 SERPL-SCNC: 25 MMOL/L
CREAT SERPL-MCNC: 0.64 MG/DL
CRP SERPL-MCNC: <3 MG/L
EGFR: 97 ML/MIN/1.73M2
EOSINOPHIL # BLD AUTO: 0.43 K/UL
EOSINOPHIL NFR BLD AUTO: 5.8 %
ERYTHROCYTE [SEDIMENTATION RATE] IN BLOOD BY WESTERGREN METHOD: 32 MM/HR
GLUCOSE SERPL-MCNC: 118 MG/DL
HCT VFR BLD CALC: 38.4 %
HGB BLD-MCNC: 12.2 G/DL
IMM GRANULOCYTES NFR BLD AUTO: 0.1 %
LYMPHOCYTES # BLD AUTO: 2.44 K/UL
LYMPHOCYTES NFR BLD AUTO: 33 %
MAN DIFF?: NORMAL
MCHC RBC-ENTMCNC: 26.6 PG
MCHC RBC-ENTMCNC: 31.8 GM/DL
MCV RBC AUTO: 83.8 FL
MONOCYTES # BLD AUTO: 0.56 K/UL
MONOCYTES NFR BLD AUTO: 7.6 %
NEUTROPHILS # BLD AUTO: 3.9 K/UL
NEUTROPHILS NFR BLD AUTO: 52.7 %
PLATELET # BLD AUTO: 291 K/UL
POTASSIUM SERPL-SCNC: 4.7 MMOL/L
PROT SERPL-MCNC: 7.2 G/DL
RBC # BLD: 4.58 M/UL
RBC # FLD: 19 %
SODIUM SERPL-SCNC: 137 MMOL/L
WBC # FLD AUTO: 7.4 K/UL

## 2023-03-16 LAB
BACTERIA BLD CULT: NORMAL
BACTERIA BLD CULT: NORMAL

## 2023-05-22 ENCOUNTER — TRANSCRIPTION ENCOUNTER (OUTPATIENT)
Age: 78
End: 2023-05-22

## 2023-05-26 DIAGNOSIS — R06.02 SHORTNESS OF BREATH: ICD-10-CM

## 2023-05-26 RX ORDER — METHYLPREDNISOLONE 4 MG/1
4 TABLET ORAL
Qty: 1 | Refills: 0 | Status: COMPLETED | COMMUNITY
Start: 2023-01-04 | End: 2023-05-26

## 2023-05-30 ENCOUNTER — APPOINTMENT (OUTPATIENT)
Dept: CARDIOTHORACIC SURGERY | Facility: CLINIC | Age: 78
End: 2023-05-30
Payer: MEDICARE

## 2023-05-30 ENCOUNTER — NON-APPOINTMENT (OUTPATIENT)
Age: 78
End: 2023-05-30

## 2023-05-30 ENCOUNTER — OUTPATIENT (OUTPATIENT)
Dept: OUTPATIENT SERVICES | Facility: HOSPITAL | Age: 78
LOS: 1 days | End: 2023-05-30
Payer: MEDICARE

## 2023-05-30 VITALS
BODY MASS INDEX: 30.1 KG/M2 | RESPIRATION RATE: 16 BRPM | OXYGEN SATURATION: 97 % | DIASTOLIC BLOOD PRESSURE: 79 MMHG | WEIGHT: 215 LBS | HEART RATE: 62 BPM | SYSTOLIC BLOOD PRESSURE: 155 MMHG | HEIGHT: 71 IN

## 2023-05-30 DIAGNOSIS — Z98.890 OTHER SPECIFIED POSTPROCEDURAL STATES: Chronic | ICD-10-CM

## 2023-05-30 DIAGNOSIS — B95.5 BACTEREMIA: ICD-10-CM

## 2023-05-30 DIAGNOSIS — R53.83 OTHER FATIGUE: ICD-10-CM

## 2023-05-30 DIAGNOSIS — Z90.49 ACQUIRED ABSENCE OF OTHER SPECIFIED PARTS OF DIGESTIVE TRACT: Chronic | ICD-10-CM

## 2023-05-30 DIAGNOSIS — I35.0 NONRHEUMATIC AORTIC (VALVE) STENOSIS: ICD-10-CM

## 2023-05-30 DIAGNOSIS — I45.10 UNSPECIFIED RIGHT BUNDLE-BRANCH BLOCK: ICD-10-CM

## 2023-05-30 DIAGNOSIS — I25.10 ATHEROSCLEROTIC HEART DISEASE OF NATIVE CORONARY ARTERY WITHOUT ANGINA PECTORIS: ICD-10-CM

## 2023-05-30 DIAGNOSIS — R78.81 BACTEREMIA: ICD-10-CM

## 2023-05-30 DIAGNOSIS — R41.3 OTHER AMNESIA: ICD-10-CM

## 2023-05-30 PROCEDURE — 93356 MYOCRD STRAIN IMG SPCKL TRCK: CPT

## 2023-05-30 PROCEDURE — 99203 OFFICE O/P NEW LOW 30 MIN: CPT

## 2023-05-30 PROCEDURE — 93000 ELECTROCARDIOGRAM COMPLETE: CPT

## 2023-05-30 PROCEDURE — 99205 OFFICE O/P NEW HI 60 MIN: CPT

## 2023-05-30 PROCEDURE — 93306 TTE W/DOPPLER COMPLETE: CPT

## 2023-05-30 PROCEDURE — 93306 TTE W/DOPPLER COMPLETE: CPT | Mod: 26

## 2023-05-30 RX ORDER — ROSUVASTATIN CALCIUM 20 MG/1
20 TABLET, FILM COATED ORAL DAILY
Qty: 90 | Refills: 3 | Status: ACTIVE | COMMUNITY
Start: 2023-05-30

## 2023-05-30 RX ORDER — INSULIN DEGLUDEC INJECTION 100 U/ML
100 INJECTION, SOLUTION SUBCUTANEOUS
Refills: 0 | Status: ACTIVE | COMMUNITY
Start: 2023-05-30

## 2023-05-30 RX ORDER — ASPIRIN 81 MG
81 TABLET, DELAYED RELEASE (ENTERIC COATED) ORAL DAILY
Refills: 0 | Status: ACTIVE | COMMUNITY
Start: 2023-05-30

## 2023-05-30 RX ORDER — LOSARTAN POTASSIUM 100 MG/1
100 TABLET, FILM COATED ORAL DAILY
Refills: 0 | Status: ACTIVE | COMMUNITY
Start: 2023-05-30

## 2023-05-30 RX ORDER — VIBEGRON 75 MG/1
75 TABLET, FILM COATED ORAL
Refills: 0 | Status: ACTIVE | COMMUNITY
Start: 2023-05-30

## 2023-05-30 RX ORDER — DONEPEZIL HYDROCHLORIDE 5 MG/1
5 TABLET ORAL
Refills: 0 | Status: ACTIVE | COMMUNITY
Start: 2023-05-30

## 2023-05-30 RX ORDER — FLUTICASONE PROPIONATE AND SALMETEROL XINAFOATE 115; 21 UG/1; UG/1
115-21 AEROSOL, METERED RESPIRATORY (INHALATION) TWICE DAILY
Refills: 0 | Status: ACTIVE | COMMUNITY
Start: 2023-05-30

## 2023-05-30 RX ORDER — METOPROLOL TARTRATE 50 MG/1
50 TABLET, FILM COATED ORAL
Qty: 180 | Refills: 0 | Status: ACTIVE | COMMUNITY
Start: 2023-05-30

## 2023-05-30 RX ORDER — PANTOPRAZOLE 40 MG/1
40 TABLET, DELAYED RELEASE ORAL DAILY
Refills: 0 | Status: ACTIVE | COMMUNITY
Start: 2023-05-30

## 2023-05-30 RX ORDER — DEXTROAMPHETAMINE SULFATE 10 MG/1
10 TABLET ORAL DAILY
Refills: 0 | Status: ACTIVE | COMMUNITY
Start: 2023-05-30

## 2023-05-30 RX ORDER — MONTELUKAST 10 MG/1
10 TABLET, FILM COATED ORAL DAILY
Refills: 0 | Status: ACTIVE | COMMUNITY
Start: 2023-05-30

## 2023-05-30 NOTE — ASSESSMENT
[FreeTextEntry1] : 77M with c/o fatigue found to have high mod/severe AS with PG mmHg of 76 and MG mmHg of 42  and an RAMANDEEP of 1.1 cm2.  \par \par The risks and benefits of both SAVR and TAVR have been explained and the patient would like to proceed with further workup and consideration for TAVR by the structural heart team.\par \par Plan:\par 1) Structural CT scan without coronaries \par 2) Cardiac Catherization to evaluate coronary anatomy\par 3) Pt will need ID clearance prior to proceeding with TAVR\par

## 2023-05-30 NOTE — CONSULT LETTER
[Dear  ___] : Dear  [unfilled], [Courtesy Letter:] : I had the pleasure of seeing your patient, [unfilled], in my office today. [Please see my note below.] : Please see my note below. [Consult Closing:] : Thank you very much for allowing me to participate in the care of this patient.  If you have any questions, please do not hesitate to contact me. [Sincerely,] : Sincerely, [FreeTextEntry2] : Dr.Marc Arteaga, [FreeTextEntry3] : Donna Mcdonald MD\par Attending Surgeon\par Cardiovascular & Thoracic Surgery\par  \par [facility] [employer] [facility]\par \par

## 2023-05-30 NOTE — END OF VISIT
[FreeTextEntry3] : \par I personally scribed for OSMAR BONILLA on May 30 2023  9:00AM . \par \par \par \par \par Physician Attestation:\par Documented by VALENTIN SALOMON acting as a scribe for OSMAR BONILLA 05/30/2023 . \par                 All medical record entries made by the Scribe were at my, OSMAR BONILLA , direction and personally dictated by me on 05/30/2023 . I have reviewed the chart and agree that the record accurately reflects my personal performance of the history, physical exam, assessment and plan\par \par

## 2023-05-30 NOTE — HISTORY OF PRESENT ILLNESS
[FreeTextEntry1] : Mr. CORONADO is a 77 year old male with  past medical history of T2DM, HTN, HLD, asthma , ADHD, BPH, memory impairment, AFib and Aortic stenosis. He is referred by Dr. Chinedu Arteaga, for initial evaluation and management for Aortic stenosis. \par \par He was admitted 1/29/23- 2/6/23  for fever, strep mutans bacteremia. HAIM demonstrated Mod AS, Mild-Mod AR, Small calcified mobile mass to the aortic valve, consistent with Lambl's excrescene . Treated with IV  ABX X 4 weeks per ID via PICC line . \par \par \par Today he presents with his wife and reports that he has been more fatigued. He denies any SOB, CP or dizziness. He does report getting some pedal edema.  He walks about 15 minutes a day, walking slowly. He states he is fine afterwards, but his wife states he is fatigued. He is a former smoker, and lives at home with his wife. He is independent in his ADL's.\par \par He had a TTE today which I reviewed by Dr. Apolonia Lancaster and I in detail with the following impressions: trileaflet AV with an RAMANDEEP of 1.1 DVI 0.25 peak and mean gradients of 76 and 42 peak velocity is 4.3 m/s SOV is 3.9cm and the visualized proximal ascending aorta is 4.3 cm. LV function is normal. \par \par \par \par ID: Dr. Dagmar Steward \par \par \par Acute , chronic or acute on chronic\par NYHA class II \par \par \par

## 2023-05-30 NOTE — PHYSICAL EXAM
[General Appearance - Alert] : alert [General Appearance - In No Acute Distress] : in no acute distress [General Appearance - Well Nourished] : well nourished [General Appearance - Well Developed] : well developed [Sclera] : the sclera and conjunctiva were normal [PERRL With Normal Accommodation] : pupils were equal in size, round, and reactive to light [Outer Ear] : the ears and nose were normal in appearance [Hearing Threshold Finger Rub Not [place]ole] : hearing was normal [Both Tympanic Membranes Were Examined] : both tympanic membranes were normal [Neck Appearance] : the appearance of the neck was normal [] : no respiratory distress [Respiration, Rhythm And Depth] : normal respiratory rhythm and effort [Auscultation Breath Sounds / Voice Sounds] : lungs were clear to auscultation bilaterally [Apical Impulse] : the apical impulse was normal [Heart Rate And Rhythm] : heart rate was normal and rhythm regular [Heart Sounds] : normal S1 and S2 [Systolic grade ___/6] : A grade [unfilled]/6 systolic murmur was heard. [Examination Of The Chest] : the chest was normal in appearance [2+] : left 2+ [Breast Appearance] : normal in appearance [Bowel Sounds] : normal bowel sounds [Abdomen Soft] : soft [No CVA Tenderness] : no ~M costovertebral angle tenderness [Abnormal Walk] : normal gait [Involuntary Movements] : no involuntary movements were seen [Musculoskeletal - Swelling] : no joint swelling seen [Skin Color & Pigmentation] : normal skin color and pigmentation [Skin Turgor] : normal skin turgor [No Focal Deficits] : no focal deficits [Oriented To Time, Place, And Person] : oriented to person, place, and time [Impaired Insight] : insight and judgment were intact [Affect] : the affect was normal [Mood] : the mood was normal [Memory Recent] : recent memory was not impaired [Memory Remote] : remote memory was not impaired [FreeTextEntry1] : Deferred

## 2023-05-30 NOTE — DATA REVIEWED
[FreeTextEntry1] : 2/1/23 HAIM revealed Normal left ventricular systolic function. No segmental wall motion abnormalities.\par Moderate aortic stenosis (known from recent transthoracic echo); Mild-moderate aortic regurgitation.\par No vegetations seen.A small, calcified, mobile mass is attached to the aortic valve leaflets on the aortic side of the valve, and is most consistent with a Lambl's excresence. It is not consistent with endocarditis.\par \par 1/31/23 TTE revealed  Calcified trileaflet aortic valve with decreased opening. Peak transaortic valve gradient equals 38 mm Hg, mean transaortic valve gradient equals 27 mm Hg, estimated aortic valve area equals 1.3 sq cm (by continuity equation), aortic valve velocity time integral equals 73 cm, consistent with moderate aortic stenosis. Increased relative wall thickness with normal left ventricular mass index, consistent with concentric left ventricular remodeling.\par \par \par

## 2023-06-02 PROBLEM — R53.83 FATIGUE: Status: ACTIVE | Noted: 2023-06-02

## 2023-06-02 PROBLEM — I45.10 RBBB (RIGHT BUNDLE BRANCH BLOCK): Status: ACTIVE | Noted: 2023-06-02

## 2023-06-02 NOTE — PHYSICAL EXAM
[Well Developed] : well developed [Obese] : obese [Normal S1, S2] : normal S1, S2 [No Rub] : no rub [Murmur] : murmur [Clear Lung Fields] : clear lung fields [Good Air Entry] : good air entry [No Respiratory Distress] : no respiratory distress  [Edema ___] : edema [unfilled] [Normal] : alert and oriented, normal memory [de-identified] : II/VI BEBETO

## 2023-06-02 NOTE — DISCUSSION/SUMMARY
[Severe Aortic Stenosis] : severe aortic stenosis [Multidetector Cardiac CT] : multidetector cardiac CT [Coronary Angiography] : coronary angiography [None] : There are no changes in medication management [Aortic Valve Replacement] : aortic valve replacement [Patient] : the patient [Family] : the patient's family [FreeTextEntry1] : Mr Gema has borderline severe AS with recently progressive fatigue. I reviewed his TTE findings with him and his wife in detail. I have also discussed all of our impressions and recommendations with his son, Dr Roderick Ribeiro, by phone with them in the office. Plan as follows:\par \par 1.  Schedule him for a cardiac structural CT and coronary angiography.\par 2.  Once completed, all imaging will be reviewed by our team and an optimal treatment strategy recommended.\par 3.  I had a detailed discussion regarding the potential risks and benefits of KEN, including death, stroke, cognitive decline, vascular complications, PVL, bleeding, infection, and the possible need for a PPM. Given his baseline RBBB, his risk of needing a PPM after KEN is markedly elevated. Dr Mcdonald will also discuss the potential risks and benefits of SAVR (which I mentioned as well).\par 4.  We will make sure to get all the details related to his infection in February and confirm with our ID colleagues that he is appropriate to proceed with AVR.\par 5.  We will notify Dr Arteaga of our impressions and plan. [EKG obtained to assist in diagnosis and management of assessed problem(s)] : EKG obtained to assist in diagnosis and management of assessed problem(s)

## 2023-06-02 NOTE — REVIEW OF SYSTEMS
[Feeling Fatigued] : feeling fatigued [Lower Ext Edema] : lower extremity edema [Negative] : Heme/Lymph [Memory Lapses Or Loss] : memory lapses or loss [Fever] : no fever [Headache] : no headache [Chills] : no chills [SOB] : no shortness of breath [Dyspnea on exertion] : not dyspnea during exertion

## 2023-06-02 NOTE — REASON FOR VISIT
[Structural Heart and Valve Disease] : structural heart and valve disease [Spouse] : spouse [FreeTextEntry3] :

## 2023-06-02 NOTE — HISTORY OF PRESENT ILLNESS
[FreeTextEntry1] : Mr. Ribeiro is a 77 year old male referred by Dr Arteaga for evaluation of Aortic Stenosis. He has a past medical history of HTN, HLD and Type 2 DM.\par \par Today he presents with his wife and reports that he has been more fatigued. He denies any SOB, CP or dizziness. He does report getting some pedal edema. He was admitted to [facility] this past February for fever, mild confusion and fatigue. He was found to be bacteremic with Strep. Mutans. He was ultimately discharged with a PICC line and was treated with 4 weeks of Ceftriaxone. He walks about 15 minutes a day, albeit slowly per his report. He states he is fine afterwards, but his wife states he is fatigued. He is a former smoker. He lives at home with his wife and is independent in his ADL's.\par \par He had a TTE today which I reviewed with Dr. Apolonia Lancaster in detail with the following impressions: a trileaflet AV with an RAMANDEEP of 1.1 sqcm, DVI 0.25, peak and mean gradients of 76 and 42 mmHg, and a peak velocity of 4.3 m/s; also noted is an SOV diameter of 3.9cm and the visualized proximal ascending aorta measures at 4.3cm. The LV function is normal.\par \par He states he has been told of having a murmur "for the past 25 years" but was not previously aware of having AS until he was told to get a cardiac clearance for a right inguinal hernia repair. He reports no known history of CAD, MI, CVA, stents, or prior cardiac surgery. He notes the hernia repair "is not an emergency" and he is taking a bowel regimen in this context. His wife notes he is fatigued, as mentioned above. She also reports "since he was here in February his memory is all messed up". He notes feeling nauseous and having diarrhea after taking his morning medications. He takes Ritalin since 2006 to "make my mind a little clearer" and he thinks this may be contributing to his symptoms. \par \par He notes with activity he has been getting "a little palpitations recently" which is new for him. He has been walking for 15 minutes outdoors on level ground without issue, as noted above. He reports no dyspnea or angina. He notes 2 years ago he had some SOB when climbing subway stairs but he has not done that since he stopped working (he was in the [employer] business and it was affected by the pandemic). He reports no syncope. He notes longstanding minor edema in the morning.

## 2023-06-06 ENCOUNTER — APPOINTMENT (OUTPATIENT)
Dept: CARDIOLOGY | Facility: CLINIC | Age: 78
End: 2023-06-06

## 2023-06-06 ENCOUNTER — RESULT REVIEW (OUTPATIENT)
Age: 78
End: 2023-06-06

## 2023-06-06 ENCOUNTER — OUTPATIENT (OUTPATIENT)
Dept: OUTPATIENT SERVICES | Facility: HOSPITAL | Age: 78
LOS: 1 days | End: 2023-06-06
Payer: MEDICARE

## 2023-06-06 DIAGNOSIS — Z00.00 ENCOUNTER FOR GENERAL ADULT MEDICAL EXAMINATION WITHOUT ABNORMAL FINDINGS: ICD-10-CM

## 2023-06-06 DIAGNOSIS — R06.02 SHORTNESS OF BREATH: ICD-10-CM

## 2023-06-06 DIAGNOSIS — I35.0 NONRHEUMATIC AORTIC (VALVE) STENOSIS: ICD-10-CM

## 2023-06-06 DIAGNOSIS — Z98.890 OTHER SPECIFIED POSTPROCEDURAL STATES: Chronic | ICD-10-CM

## 2023-06-06 DIAGNOSIS — Z90.49 ACQUIRED ABSENCE OF OTHER SPECIFIED PARTS OF DIGESTIVE TRACT: Chronic | ICD-10-CM

## 2023-06-06 PROCEDURE — G1004: CPT

## 2023-06-06 PROCEDURE — 75572 CT HRT W/3D IMAGE: CPT | Mod: ME

## 2023-06-06 PROCEDURE — 75572 CT HRT W/3D IMAGE: CPT | Mod: 26,ME

## 2023-06-22 ENCOUNTER — APPOINTMENT (OUTPATIENT)
Dept: INFECTIOUS DISEASE | Facility: CLINIC | Age: 78
End: 2023-06-22

## 2023-06-22 ENCOUNTER — LABORATORY RESULT (OUTPATIENT)
Age: 78
End: 2023-06-22

## 2023-06-22 DIAGNOSIS — B95.5 BACTEREMIA: ICD-10-CM

## 2023-06-22 DIAGNOSIS — R78.81 BACTEREMIA: ICD-10-CM

## 2023-06-27 ENCOUNTER — NON-APPOINTMENT (OUTPATIENT)
Age: 78
End: 2023-06-27

## 2023-07-06 ENCOUNTER — NON-APPOINTMENT (OUTPATIENT)
Age: 78
End: 2023-07-06

## 2023-07-17 ENCOUNTER — APPOINTMENT (OUTPATIENT)
Dept: INFECTIOUS DISEASE | Facility: CLINIC | Age: 78
End: 2023-07-17

## 2023-07-17 ENCOUNTER — NON-APPOINTMENT (OUTPATIENT)
Age: 78
End: 2023-07-17